# Patient Record
Sex: FEMALE | Race: WHITE | NOT HISPANIC OR LATINO | Employment: OTHER | URBAN - METROPOLITAN AREA
[De-identification: names, ages, dates, MRNs, and addresses within clinical notes are randomized per-mention and may not be internally consistent; named-entity substitution may affect disease eponyms.]

---

## 2017-02-03 DIAGNOSIS — M85.80 OSTEOPENIA: ICD-10-CM

## 2017-02-06 RX ORDER — ALENDRONATE SODIUM 70 MG/1
70 TABLET ORAL
Qty: 12 TAB | Refills: 1 | Status: SHIPPED | OUTPATIENT
Start: 2017-02-06 | End: 2017-04-28 | Stop reason: SDUPTHER

## 2017-02-27 RX ORDER — CYANOCOBALAMIN 1000 UG/ML
INJECTION, SOLUTION INTRAMUSCULAR; SUBCUTANEOUS
Qty: 1 ML | Refills: 0 | Status: SHIPPED | OUTPATIENT
Start: 2017-02-27 | End: 2017-04-28 | Stop reason: SDUPTHER

## 2017-03-06 RX ORDER — LEVOTHYROXINE SODIUM 200 MCG
TABLET ORAL
Qty: 90 TAB | Refills: 0 | Status: SHIPPED | OUTPATIENT
Start: 2017-03-06 | End: 2017-06-07 | Stop reason: SDUPTHER

## 2017-03-06 NOTE — TELEPHONE ENCOUNTER
Was the patient seen in the last year in this department? No LOV: 3-3-16    Does patient have an active prescription for medications requested? No     Received Request Via: Pharmacy

## 2017-03-16 DIAGNOSIS — Z00.00 PREVENTATIVE HEALTH CARE: ICD-10-CM

## 2017-03-16 DIAGNOSIS — M85.80 OSTEOPENIA: ICD-10-CM

## 2017-03-16 RX ORDER — LISINOPRIL 40 MG/1
40 TABLET ORAL DAILY
Qty: 90 TAB | Refills: 1 | OUTPATIENT
Start: 2017-03-16

## 2017-04-04 RX ORDER — CYANOCOBALAMIN 1000 UG/ML
INJECTION, SOLUTION INTRAMUSCULAR; SUBCUTANEOUS
Qty: 1 ML | Refills: 0 | OUTPATIENT
Start: 2017-04-04

## 2017-04-10 ENCOUNTER — TELEPHONE (OUTPATIENT)
Dept: MEDICAL GROUP | Facility: LAB | Age: 67
End: 2017-04-10

## 2017-04-10 NOTE — TELEPHONE ENCOUNTER
1. Caller Name: Patient                                         Call Back Number: [phone] (home)       Patient approves a detailed voicemail message: yes    Patient would like to know if she needs labs ordered before her appointment. Please advise.

## 2017-04-12 RX ORDER — FLUOXETINE HYDROCHLORIDE 20 MG/1
CAPSULE ORAL
Qty: 60 CAP | Refills: 0 | Status: SHIPPED | OUTPATIENT
Start: 2017-04-12 | End: 2017-04-28

## 2017-04-12 NOTE — TELEPHONE ENCOUNTER
Was the patient seen in the last year in this department? No     Does patient have an active prescription for medications requested? No     Received Request Via: Pharmacy     Last visit:3/3/16    Patient has appointment on 4/28

## 2017-04-13 RX ORDER — CYANOCOBALAMIN 1000 UG/ML
INJECTION, SOLUTION INTRAMUSCULAR; SUBCUTANEOUS
Qty: 1 ML | Refills: 0 | OUTPATIENT
Start: 2017-04-13

## 2017-04-13 NOTE — TELEPHONE ENCOUNTER
Was the patient seen in the last year in this department? No     Does patient have an active prescription for medications requested? No     Received Request Via: Patient

## 2017-04-18 ENCOUNTER — NON-PROVIDER VISIT (OUTPATIENT)
Dept: MEDICAL GROUP | Facility: LAB | Age: 67
End: 2017-04-18
Payer: COMMERCIAL

## 2017-04-18 DIAGNOSIS — E53.8 B12 DEFICIENCY: ICD-10-CM

## 2017-04-18 PROCEDURE — 96372 THER/PROPH/DIAG INJ SC/IM: CPT | Performed by: FAMILY MEDICINE

## 2017-04-18 RX ORDER — CYANOCOBALAMIN 1000 UG/ML
1000 INJECTION, SOLUTION INTRAMUSCULAR; SUBCUTANEOUS ONCE
Status: COMPLETED | OUTPATIENT
Start: 2017-04-18 | End: 2017-04-18

## 2017-04-18 RX ADMIN — CYANOCOBALAMIN 1000 MCG: 1000 INJECTION, SOLUTION INTRAMUSCULAR; SUBCUTANEOUS at 13:20

## 2017-04-18 NOTE — NON-PROVIDER
Lacy Ace is a 66 y.o. female here for a non-provider visit for B-12 injection.    Reason for injection: pernicious anema  Order in MAR?: Yes  Patient supplied?:No  Minimum interval has been met for this injection (per MAR order): Yes    Order and dose verified by: Farida Bowles  Patient tolerated injection and no adverse effects were observed or reported.    # of Administrations remaining in MAR:0

## 2017-04-18 NOTE — MR AVS SNAPSHOT
Lacy Martini Malka   2017 1:20 PM   Non-Provider Visit   MRN: [id number]    Department:  [facility]   Dept Phone:  [phone]    Description:  Female : 1950   Provider:  SERVANDO GOMES MA           Reason for Visit     Injections B-12      Allergies as of 2017     Allergen Noted Reactions    Nsaids 2016   Swelling    Facial swellness      You were diagnosed with     B12 deficiency   [[id number]]         Vital Signs     Smoking Status                   Never Smoker            Basic Information     Date Of Birth Sex Race Ethnicity Preferred Language    1950 Female White Non- English      Your appointments     2017  1:00 PM   ANNUAL EXAM PREVENTATIVE with May Villanueva M.D.   [facility] (--)    [address] [id number] [phone]              Problem List              ICD-10-CM Priority Class Noted - Resolved    Osteopenia M85.80   Unknown - Present    Menopause Z78.0   Unknown - Present    Pernicious anemia D51.0   Unknown - Present    Hypothyroid E03.9   Unknown - Present    Hypertension I10   8/15/2011 - Present    Urticaria, idiopathic L50.1   5/3/2012 - Present    Hyperlipidemia E78.5   5/3/2012 - Present    Depression F32.9   5/3/2012 - Present    Calcaneal spur M77.30   10/31/2012 - Present    MEDICAL HOME    Unknown - Present    Type 1 diabetes mellitus (CMS-HCC) E10.9   10/28/2013 - Present    PREMA on CPAP G47.33   3/3/2016 - Present    Vitamin D insufficiency E55.9   3/3/2016 - Present    B12 deficiency E53.8   2017 - Present      Health Maintenance        Date Due Completion Dates    PAP SMEAR 8/15/2014 8/15/2011, 2009    DIABETES MONOFILAMENT / LE EXAM 10/1/2014 10/1/2013 (Prv Comp)    Override on 10/1/2013: Previously completed (Dr. Lares's office)    A1C SCREENING 2016, 8/10/2015, 2015, 2014, 10/1/2013 (Prv Comp), 3/22/2012, 2011    Override on 10/1/2013:  Previously completed (Dr. Lares's office POCT)    RETINAL SCREENING 10/26/2016 10/26/2015, 10/22/2014    FASTING LIPID PROFILE 1/21/2017 1/21/2016, 11/5/2014, 9/10/2013, 3/22/2012, 8/2/2011    URINE ACR / MICROALBUMIN 1/21/2017 1/21/2016, 11/5/2014, 9/10/2013, 3/22/2012, 8/2/2011    SERUM CREATININE 1/21/2017 1/21/2016, 8/10/2015, 11/5/2014, 9/10/2013, 10/31/2012, 3/22/2012, 8/2/2011    MAMMOGRAM 3/30/2017 3/30/2016, 12/3/2013, 11/29/2012, 10/4/2011, 1/7/2009, 1/7/2009    IMM DTaP/Tdap/Td Vaccine (2 - Td) 10/24/2022 10/24/2012    COLONOSCOPY 4/26/2026 4/26/2016, 1/4/2006            Current Immunizations     Influenza TIV (IM) 10/28/2013  3:22 PM    Influenza Vaccine Quad Inj (Pf) 10/18/2016 10:30 AM, 10/21/2015  4:45 PM, 9/30/2014    Pneumococcal polysaccharide vaccine (PPSV-23) 10/24/2012  2:24 PM    SHINGLES VACCINE 10/24/2012  2:23 PM    Tdap Vaccine 10/24/2012  2:20 PM      Below and/or attached are the medications your provider expects you to take. Review all of your home medications and newly ordered medications with your provider and/or pharmacist. Follow medication instructions as directed by your provider and/or pharmacist. Please keep your medication list with you and share with your provider. Update the information when medications are discontinued, doses are changed, or new medications (including over-the-counter products) are added; and carry medication information at all times in the event of emergency situations     Allergies:  NSAIDS - Swelling               Medications  Valid as of: April 18, 2017 -  1:24 PM    Generic Name Brand Name Tablet Size Instructions for use    Alendronate Sodium (Tab) FOSAMAX 70 MG Take 1 Tab by mouth every 7 days.        Calcium Carbonate-Vitamin D (Tab) Calcium + D 600-200 MG-UNIT Take 600 mg by mouth 3 times a day.        Cetirizine HCl (Tab) ZYRTEC 10 MG Take 10 mg by mouth every day.        Cholecalciferol (Tab) vitamin D 2000 UNIT Take 1 Tab by mouth every day.       "   Cyanocobalamin (Solution) VITAMIN B-12 1000 MCG/ML INJECT 0.1 ML VIAL BY INTRAMUSCULAR ROUTE EVERY 30 DAYS        Fluocinonide (Solution) LIDEX 0.05 % Apply  to affected area(s) 2 times a day. Topical use only          FLUoxetine HCl (Cap) PROZAC 40 MG Take 1 Cap by mouth every day.        FLUoxetine HCl (Cap) PROZAC 20 MG TAKE 2 CAPSULES BY MOUTH EVERY DAY        Insulin Aspart (Solution) NOVOLOG 100 UNIT/ML Inject  as instructed 3 times a day before meals. Sliding scale as per Dr. Pérez         Insulin Glargine (Solution) LANTUS 100 UNIT/ML Inject 26 Units as instructed every bedtime.        Insulin Syringe-Needle U-100 (Misc) INS SYRINGE/NEEDLE 1CC/28G 28G X 1/2\" 1 ML Use to administer B12 as directed        Levothyroxine Sodium (Tab) SYNTHROID 200 MCG TAKE 1 TABLET BY MOUTH EVERY DAY        Lisinopril (Tab) PRINIVIL, ZESTRIL 40 MG Take 40 mg by mouth every day.          Multiple Vitamins-Minerals (Tab) CENTRUM SILVER  Take 1 Tab by mouth every day. daily        Simvastatin (Tab) ZOCOR 10 MG Take 10 mg by mouth every evening.        Zolpidem Tartrate (Tab) AMBIEN 10 MG Take 1 Tab by mouth at bedtime as needed for Sleep.        .                 Medicines prescribed today were sent to:     [id number] - [place] - [address] [facility] [address]    [address] [facility] [address]    Phone: [phone] Fax: [phone]    Open 24 Hours?: No    [facility] - [place], [place] - [address]    [address] [facility] [address]    Phone: [phone] Fax: [phone]    Open 24 Hours?: No    [facility] - [place], [place] - [address]    [address] [place] [address]    Phone: [phone] Fax: [phone]    Open 24 Hours?: No      Medication refill instructions:       If your prescription bottle indicates you have medication refills left, it is not necessary to call your provider’s office. " Please contact your pharmacy and they will refill your medication.    If your prescription bottle indicates you do not have any refills left, you may request refills at any time through one of the following ways: The online [employer] system (except Urgent Care), by calling your provider’s office, or by asking your pharmacy to contact your provider’s office with a refill request. Medication refills are processed only during regular business hours and may not be available until the next business day. Your provider may request additional information or to have a follow-up visit with you prior to refilling your medication.   *Please Note: Medication refills are assigned a new Rx number when refilled electronically. Your pharmacy may indicate that no refills were authorized even though a new prescription for the same medication is available at the pharmacy. Please request the medicine by name with the pharmacy before contacting your provider for a refill.        Other Notes About Your Plan     Lacy is a [facility] patient of Dr Edson Garcia Access Code: Activation code not generated  Current [employer] Status: Active

## 2017-04-28 ENCOUNTER — OFFICE VISIT (OUTPATIENT)
Dept: MEDICAL GROUP | Facility: LAB | Age: 67
End: 2017-04-28
Payer: COMMERCIAL

## 2017-04-28 VITALS
RESPIRATION RATE: 16 BRPM | WEIGHT: 218 LBS | BODY MASS INDEX: 38.62 KG/M2 | HEART RATE: 62 BPM | TEMPERATURE: 97.9 F | DIASTOLIC BLOOD PRESSURE: 92 MMHG | HEIGHT: 63 IN | OXYGEN SATURATION: 95 % | SYSTOLIC BLOOD PRESSURE: 142 MMHG

## 2017-04-28 DIAGNOSIS — F32.A DEPRESSION, UNSPECIFIED DEPRESSION TYPE: ICD-10-CM

## 2017-04-28 DIAGNOSIS — Z11.59 NEED FOR HEPATITIS C SCREENING TEST: ICD-10-CM

## 2017-04-28 DIAGNOSIS — E78.5 HYPERLIPIDEMIA, UNSPECIFIED HYPERLIPIDEMIA TYPE: ICD-10-CM

## 2017-04-28 DIAGNOSIS — Z12.31 ENCOUNTER FOR SCREENING MAMMOGRAM FOR BREAST CANCER: ICD-10-CM

## 2017-04-28 DIAGNOSIS — R82.90 MALODOROUS URINE: ICD-10-CM

## 2017-04-28 DIAGNOSIS — E03.8 OTHER SPECIFIED HYPOTHYROIDISM: ICD-10-CM

## 2017-04-28 DIAGNOSIS — E53.8 B12 DEFICIENCY: ICD-10-CM

## 2017-04-28 DIAGNOSIS — I10 ESSENTIAL HYPERTENSION: ICD-10-CM

## 2017-04-28 DIAGNOSIS — E10.9 TYPE 1 DIABETES MELLITUS WITHOUT COMPLICATION (HCC): ICD-10-CM

## 2017-04-28 DIAGNOSIS — E55.9 VITAMIN D INSUFFICIENCY: ICD-10-CM

## 2017-04-28 DIAGNOSIS — D51.0 PERNICIOUS ANEMIA: ICD-10-CM

## 2017-04-28 DIAGNOSIS — Z00.00 ROUTINE GENERAL MEDICAL EXAMINATION AT HEALTH CARE FACILITY: ICD-10-CM

## 2017-04-28 DIAGNOSIS — M85.80 OSTEOPENIA: ICD-10-CM

## 2017-04-28 LAB
APPEARANCE UR: CLEAR
BILIRUB UR STRIP-MCNC: NORMAL MG/DL
COLOR UR AUTO: YELLOW
GLUCOSE UR STRIP.AUTO-MCNC: NORMAL MG/DL
KETONES UR STRIP.AUTO-MCNC: NORMAL MG/DL
LEUKOCYTE ESTERASE UR QL STRIP.AUTO: NORMAL
NITRITE UR QL STRIP.AUTO: NORMAL
PH UR STRIP.AUTO: 7 [PH] (ref 5–8)
PROT UR QL STRIP: NORMAL MG/DL
RBC UR QL AUTO: NORMAL
SP GR UR STRIP.AUTO: 1
UROBILINOGEN UR STRIP-MCNC: NORMAL MG/DL

## 2017-04-28 PROCEDURE — 99396 PREV VISIT EST AGE 40-64: CPT | Performed by: FAMILY MEDICINE

## 2017-04-28 PROCEDURE — 81002 URINALYSIS NONAUTO W/O SCOPE: CPT | Performed by: FAMILY MEDICINE

## 2017-04-28 RX ORDER — CYANOCOBALAMIN 1000 UG/ML
INJECTION, SOLUTION INTRAMUSCULAR; SUBCUTANEOUS
Qty: 1 ML | Refills: 3 | Status: SHIPPED | OUTPATIENT
Start: 2017-04-28 | End: 2017-05-12 | Stop reason: SDUPTHER

## 2017-04-28 RX ORDER — ALENDRONATE SODIUM 70 MG/1
70 TABLET ORAL
Qty: 12 TAB | Refills: 3 | Status: SHIPPED | OUTPATIENT
Start: 2017-04-28 | End: 2018-05-10 | Stop reason: SDUPTHER

## 2017-04-28 RX ORDER — LEVOTHYROXINE SODIUM 200 MCG
200 TABLET ORAL
Qty: 90 TAB | Refills: 3 | Status: SHIPPED | OUTPATIENT
Start: 2017-04-28

## 2017-04-28 RX ORDER — FLUOXETINE HYDROCHLORIDE 40 MG/1
40 CAPSULE ORAL DAILY
Qty: 90 CAP | Refills: 3 | Status: SHIPPED | OUTPATIENT
Start: 2017-04-28

## 2017-04-28 ASSESSMENT — ENCOUNTER SYMPTOMS
NAUSEA: 0
DOUBLE VISION: 0
SHORTNESS OF BREATH: 0
VOMITING: 0
MYALGIAS: 0
FEVER: 0
HEADACHES: 0

## 2017-04-28 ASSESSMENT — PATIENT HEALTH QUESTIONNAIRE - PHQ9: CLINICAL INTERPRETATION OF PHQ2 SCORE: 2

## 2017-04-28 NOTE — PROGRESS NOTES
Subjective:      Lacy Ace is a 66 y.o. female who presents with Annual Exam and Medication Refill    Chief Complaint   Patient presents with   • Annual Exam     No Pap   • Medication Refill     Pending in orders             HPI    Would like to be tested for hep C. No tattoos, no snf time. No history of IVDA     Patient Active Problem List    Diagnosis Date Noted   • B12 deficiency  Chronic. On B12 monthly  04/18/2017   • PREMA on CPAP  Not using CPAP at night. Is uncomfortable  Not using this for about one year   Does not want to deal with this now  03/03/2016   • Vitamin D insufficiency  Taking vitamin D 03/03/2016   • Type 1 diabetes mellitus (CMS-[facility])  Has appt monday with endocrinology  10/28/2013   • MEDICAL HOME    • Calcaneal spur 10/31/2012   • Urticaria, idiopathic 05/03/2012   • Hyperlipidemia  On medication  05/03/2012   • Depression  On prozac  05/03/2012   • Hypertension  On medication  08/15/2011   • Osteopenia  On fosamax for at least 5 years . Was off this and fractured her ankle so she is back on this     • Menopause    • Pernicious anemia    • Hypothyroid  Followed by endo       Family History   Problem Relation Age of Onset   • Diabetes Mother    • Hypertension Mother    • Cancer Mother      multiple myeloma   • Heart Disease Father    • Cancer Father      lung   • Diabetes Sister    • Diabetes Brother      Social History     Social History   • Marital Status:      Spouse Name: N/A   • Number of Children: N/A   • Years of Education: N/A     Occupational History   • Not on file.     Social History Main Topics   • Smoking status: Never Smoker    • Smokeless tobacco: Never Used   • Alcohol Use: Yes      Comment: occasional   • Drug Use: Not on file   • Sexual Activity:     Partners: Male     Other Topics Concern   • Not on file     Social History Narrative       Current outpatient prescriptions:   •  fluoxetine (PROZAC) 20 MG Cap, TAKE 2 CAPSULES BY MOUTH EVERY DAY, Disp: 60 Cap, Rfl:  "0  •  SYNTHROID 200 MCG Tab, TAKE 1 TABLET BY MOUTH EVERY DAY, Disp: 90 Tab, Rfl: 0  •  cyanocobalamin (VITAMIN B-12) 1000 MCG/ML Solution, INJECT 0.1 ML VIAL BY INTRAMUSCULAR ROUTE EVERY 30 DAYS, Disp: 1 mL, Rfl: 0  •  alendronate (FOSAMAX) 70 MG Tab, Take 1 Tab by mouth every 7 days., Disp: 12 Tab, Rfl: 1  •  fluoxetine (PROZAC) 40 MG capsule, Take 1 Cap by mouth every day., Disp: 90 Cap, Rfl: 3  •  zolpidem (AMBIEN) 10 MG Tab, Take 1 Tab by mouth at bedtime as needed for Sleep., Disp: 30 Tab, Rfl: 1  •  insulin glargine (LANTUS) 100 UNIT/ML SOLN, Inject 26 Units as instructed every bedtime. (Patient taking differently: Inject 22 Units as instructed every bedtime.), Disp: 10 mL, Rfl: 0  •  INS SYRINGE/NEEDLE 1CC/28G (B-D INSULIN SYRINGE 1CC/28G) 28G X 1/2\" 1 ML MISC, Use to administer B12 as directed, Disp: 100 Each, Rfl: 0  •  lisinopril (PRINIVIL, ZESTRIL) 40 MG tablet, Take 40 mg by mouth every day.  , Disp: , Rfl:   •  simvastatin (ZOCOR) 10 MG TABS, Take 10 mg by mouth every evening., Disp: , Rfl:   •  fluocinonide (LIDEX) 0.05 % external solution, Apply  to affected area(s) 2 times a day. Topical use only  (Patient taking differently: Apply 1 Application to affected area(s) 2 times a day. Topical use only ), Disp: 60 mL, Rfl: 11  •  insulin aspart (NOVOLOG FLEXPEN) 100 UNIT/ML SOLN, Inject  as instructed 3 times a day before meals. Sliding scale as per Dr. Pérez , Disp: , Rfl:   •  Cholecalciferol (VITAMIN D) 2000 UNIT TABS, Take 1 Tab by mouth every day., Disp: , Rfl:   •  cetirizine (ZYRTEC) 10 MG TABS, Take 10 mg by mouth every day., Disp: , Rfl:   •  CENTRUM SILVER TABS, Take 1 Tab by mouth every day. daily, Disp: , Rfl:   •  CALCIUM + D 600-200 MG-UNIT TABS, Take 600 mg by mouth 3 times a day., Disp: , Rfl:       Review of Systems   Constitutional: Negative for fever.   Eyes: Negative for double vision.        Last eye exam october 2016    Respiratory: Negative for shortness of breath.  " "  Cardiovascular: Negative for chest pain.   Gastrointestinal: Negative for nausea and vomiting.   Genitourinary: Negative for frequency.        Different odor x 3 weeks   Musculoskeletal: Negative for myalgias.   Neurological: Negative for headaches.   Endo/Heme/Allergies: Positive for environmental allergies (zyrtec every day ).   Psychiatric/Behavioral:        On prozac for depression           Objective:     /92 mmHg  Pulse 62  Temp(Src) 36.6 °C (97.9 °F)  Resp 16  Ht 1.588 m (5' 2.52\")  Wt 98.884 kg (218 lb)  BMI 39.21 kg/m2  SpO2 95%     Physical Exam   Constitutional: She appears well-developed and well-nourished. She is active and cooperative.  Non-toxic appearance. She does not have a sickly appearance. No distress.   HENT:   Head: Normocephalic and atraumatic.   Right Ear: Tympanic membrane normal.   Left Ear: Tympanic membrane normal.   Mouth/Throat: Uvula is midline, oropharynx is clear and moist and mucous membranes are normal.   Eyes: Conjunctivae and EOM are normal.   Neck: Carotid bruit is not present. No thyromegaly present.   Cardiovascular: Normal rate, regular rhythm and normal heart sounds.    Pulmonary/Chest: Effort normal and breath sounds normal. No tachypnea. No respiratory distress. She has no decreased breath sounds. She has no wheezes. She has no rhonchi. She has no rales.   Abdominal: Soft. She exhibits no distension. There is no tenderness. There is no rigidity, no rebound and no guarding.   Lymphadenopathy:     She has no cervical adenopathy.   Neurological: She is alert. She is not disoriented. She displays no tremor. She displays no seizure activity. Coordination and gait normal.   Skin: Skin is warm and dry. She is not diaphoretic.   Psychiatric: She has a normal mood and affect. Her speech is normal and behavior is normal.               3/30/2016 1:47 PM    HISTORY/REASON FOR EXAM:  Postmenopausal, hysterectomy, osteopenia, diabetes, hypothyroidism    TECHNIQUE/EXAM " DESCRIPTION AND NUMBER OF VIEWS:  Dual x-ray bone densitometry was performed from the L1 through L4 levels and from the proximal left femur utilizing the GE Prodigy unit.    COMPARISON:   None    FINDINGS:  The mean bone mineral density for the lumbar spine is 1.120 g/cm2, which corresponds to a T score of -0.5 and a Z score of 1.0.    The proximal left femur has a mean bone mineral density of 1.073 g/cm2, with a T score of 0.5 and a Z score of 1.6.         Impression        According to the World Health Organization classification, bone mineral density of this patient is normal.      10-year Probability of Fracture:  Major Osteoporotic     11.3%  Hip     0.6%  Population      [place] ()    Based on left femur neck BMD     UA is normal      Assessment/Plan:     1. Routine general medical examination at health care facility  She is to follow up with specialist as planned     2. Essential hypertension  Recommend checking BP as outpatient   - TSH; Future  - COMP METABOLIC PANEL; Future  - MICROALBUMIN CREAT RATIO URINE; Future  - CBC WITH DIFFERENTIAL; Future    3. Osteopenia  On fosamax. dexa last year   - alendronate (FOSAMAX) 70 MG Tab; Take 1 Tab by mouth every 7 days.  Dispense: 12 Tab; Refill: 3    4. B12 deficiency  Check B12. Refilled medication     5. Vitamin D insufficiency  Check vit d level   - VITAMIN D,25 HYDROXY; Future    6. Type 1 diabetes mellitus without complication (CMS-[facility])  Followed by endocrinology   - LIPID PROFILE; Future  - COMP METABOLIC PANEL; Future  - MICROALBUMIN CREAT RATIO URINE; Future    7. Hyperlipidemia, unspecified hyperlipidemia type  Check labs. On statin   - LIPID PROFILE; Future  - COMP METABOLIC PANEL; Future    8. Depression, unspecified depression type  Discussed with patient trying 20 mg bid. She would like to continue with 40 mg in AM  - fluoxetine (PROZAC) 40 MG capsule; Take 1 Cap by mouth every day.  Dispense: 90 Cap; Refill: 3    9. Pernicious anemia  Check labs.    - VITAMIN B12; Future  - FOLATE; Future  - CBC WITH DIFFERENTIAL; Future    10. Other specified hypothyroidism  Check labs. Refilled medication   - SYNTHROID 200 MCG Tab; Take 1 Tab by mouth every day.  Dispense: 90 Tab; Refill: 3  - TSH; Future  - FREE THYROXINE; Future    11. Need for hepatitis C screening test  Check hep C virus antibody   - HEP C VIRUS ANTIBODY; Future    12. Malodorous urine  UA normal   - POCT Urinalysis    13. Encounter for screening mammogram for breast cancer  Mammogram ordered   - MA-SCREEN MAMMO W/CAD-BILAT'

## 2017-04-28 NOTE — MR AVS SNAPSHOT
"        Lacy Bunhchn   2017 1:00 PM   Office Visit   MRN: [id number]    Department:  [facility]   Dept Phone:  [phone]    Description:  Female : 1950   Provider:  May Villanueva M.D.           Reason for Visit     Annual Exam No Pap    Medication Refill Pending in orders      Allergies as of 2017     Allergen Noted Reactions    Nsaids 2016   Swelling    Facial swellness      You were diagnosed with     Routine general medical examination at health care facility   [514982]       Essential hypertension   [4413344]       Osteopenia   [221807]       B12 deficiency   [124935]       Vitamin D insufficiency   [843675]       Type 1 diabetes mellitus without complication (CMS-HCC)   [010463]       Hyperlipidemia, unspecified hyperlipidemia type   [6758596]       Depression, unspecified depression type   [1515873]       Pernicious anemia   [281.0.ICD-9-CM]       Other specified hypothyroidism   [6044434]       Need for hepatitis C screening test   [197483]       Malodorous urine   [681562]       Encounter for screening mammogram for breast cancer   [0209059]         Vital Signs     Blood Pressure Pulse Temperature Respirations Height Weight    142/92 mmHg 62 36.6 °C (97.9 °F) 16 1.588 m (5' 2.52\") 98.884 kg (218 lb)    Body Mass Index Oxygen Saturation Smoking Status             39.21 kg/m2 95% Never Smoker          Basic Information     Date Of Birth Sex Race Ethnicity Preferred Language    1950 Female White Non- English      Problem List              ICD-10-CM Priority Class Noted - Resolved    Osteopenia M85.80   Unknown - Present    Menopause Z78.0   Unknown - Present    Pernicious anemia D51.0   Unknown - Present    Hypothyroid E03.9   Unknown - Present    Hypertension I10   8/15/2011 - Present    Urticaria, idiopathic L50.1   5/3/2012 - Present    Hyperlipidemia E78.5   5/3/2012 - Present    Depression F32.9   5/3/2012 - Present    Calcaneal spur M77.30   " 10/31/2012 - Present    Type 1 diabetes mellitus (CMS-HCC) E10.9   10/28/2013 - Present    PREMA on CPAP G47.33   3/3/2016 - Present    Vitamin D insufficiency E55.9   3/3/2016 - Present    B12 deficiency E53.8   4/18/2017 - Present      Health Maintenance        Date Due Completion Dates    PAP SMEAR 8/15/2014 8/15/2011, 9/2/2009    DIABETES MONOFILAMENT / LE EXAM 10/1/2014 10/1/2013 (Prv Comp)    Override on 10/1/2013: Previously completed (Dr. Lares's office)    A1C SCREENING 7/21/2016 1/21/2016, 8/10/2015, 5/11/2015, 11/5/2014, 10/1/2013 (Prv Comp), 3/22/2012, 11/5/2011    Override on 10/1/2013: Previously completed (Dr. Lares's office POCT)    RETINAL SCREENING 10/26/2016 10/26/2015, 10/22/2014    FASTING LIPID PROFILE 1/21/2017 1/21/2016, 11/5/2014, 9/10/2013, 3/22/2012, 8/2/2011    URINE ACR / MICROALBUMIN 1/21/2017 1/21/2016, 11/5/2014, 9/10/2013, 3/22/2012, 8/2/2011    SERUM CREATININE 1/21/2017 1/21/2016, 8/10/2015, 11/5/2014, 9/10/2013, 10/31/2012, 3/22/2012, 8/2/2011    MAMMOGRAM 3/30/2017 3/30/2016, 12/3/2013, 11/29/2012, 10/4/2011, 1/7/2009, 1/7/2009    IMM DTaP/Tdap/Td Vaccine (2 - Td) 10/24/2022 10/24/2012    COLONOSCOPY 4/26/2026 4/26/2016, 1/4/2006            Current Immunizations     Influenza TIV (IM) 10/28/2013  3:22 PM    Influenza Vaccine Quad Inj (Pf) 10/18/2016 10:30 AM, 10/21/2015  4:45 PM, 9/30/2014    Pneumococcal polysaccharide vaccine (PPSV-23) 10/24/2012  2:24 PM    SHINGLES VACCINE 10/24/2012  2:23 PM    Tdap Vaccine 10/24/2012  2:20 PM      Below and/or attached are the medications your provider expects you to take. Review all of your home medications and newly ordered medications with your provider and/or pharmacist. Follow medication instructions as directed by your provider and/or pharmacist. Please keep your medication list with you and share with your provider. Update the information when medications are discontinued, doses are changed, or new medications (including  "over-the-counter products) are added; and carry medication information at all times in the event of emergency situations     Allergies:  NSAIDS - Swelling               Medications  Valid as of: April 28, 2017 -  1:40 PM    Generic Name Brand Name Tablet Size Instructions for use    Alendronate Sodium (Tab) FOSAMAX 70 MG Take 1 Tab by mouth every 7 days.        Calcium Carbonate-Vitamin D (Tab) Calcium + D 600-200 MG-UNIT Take 600 mg by mouth 3 times a day.        Cetirizine HCl (Tab) ZYRTEC 10 MG Take 10 mg by mouth every day.        Cholecalciferol (Tab) vitamin D 2000 UNIT Take 1 Tab by mouth every day.        Cyanocobalamin (Solution) VITAMIN B-12 1000 MCG/ML INJECT 0.1 ML VIAL BY INTRAMUSCULAR ROUTE EVERY 30 DAYS        Fluocinonide (Solution) LIDEX 0.05 % Apply  to affected area(s) 2 times a day. Topical use only          FLUoxetine HCl (Cap) PROZAC 40 MG Take 1 Cap by mouth every day.        Insulin Aspart (Solution) NOVOLOG 100 UNIT/ML Inject  as instructed 3 times a day before meals. Sliding scale as per Dr. Pérez         Insulin Glargine (Solution) LANTUS 100 UNIT/ML Inject 26 Units as instructed every bedtime.        Insulin Syringe-Needle U-100 ([facility] [place] [facility]) INS SYRINGE/NEEDLE 1CC/28G 28G X 1/2\" 1 ML Use to administer B12 as directed        Levothyroxine Sodium (Tab) SYNTHROID 200 MCG Take 1 Tab by mouth every day.        Lisinopril (Tab) PRINIVIL, ZESTRIL 40 MG Take 40 mg by mouth every day.          Multiple Vitamins-Minerals (Tab) CENTRUM SILVER  Take 1 Tab by mouth every day. daily        Simvastatin (Tab) ZOCOR 10 MG Take 10 mg by mouth every evening.        .                 Medicines prescribed today were sent to:     [id number] - [place] - [address] AT [facility] [address]    [address] [address]    Phone: [phone] Fax: [phone]    Open 24 Hours?: No    [facility]  - [place], [place] - [address] [place] [address] " [address] [address]    Phone: [phone] Fax: [phone]    Open 24 Hours?: No    [facility] - [place], [place] - [address]    [address] [place] [address]    Phone: [phone] Fax: [phone]    Open 24 Hours?: No      Medication refill instructions:       If your prescription bottle indicates you have medication refills left, it is not necessary to call your provider’s office. Please contact your pharmacy and they will refill your medication.    If your prescription bottle indicates you do not have any refills left, you may request refills at any time through one of the following ways: The online "[employer] system (except Urgent Care), by calling your provider’s office, or by asking your pharmacy to contact your provider’s office with a refill request. Medication refills are processed only during regular business hours and may not be available until the next business day. Your provider may request additional information or to have a follow-up visit with you prior to refilling your medication.   *Please Note: Medication refills are assigned a new Rx number when refilled electronically. Your pharmacy may indicate that no refills were authorized even though a new prescription for the same medication is available at the pharmacy. Please request the medicine by name with the pharmacy before contacting your provider for a refill.        Your To Do List     Future Labs/Procedures Complete By Expires    CBC WITH DIFFERENTIAL  As directed 4/28/2018    COMP METABOLIC PANEL  As directed 4/28/2018    FOLATE  As directed 4/28/2018    FREE THYROXINE  As directed 4/28/2018    HEP C VIRUS ANTIBODY  As directed 4/28/2018    LIPID PROFILE  As directed 4/28/2018    MICROALBUMIN CREAT RATIO URINE  As directed 4/28/2018    TSH  As directed 4/28/2018    VITAMIN B12  As directed 4/28/2018    VITAMIN D,25 HYDROXY  As directed 4/28/2018      Other Notes About Your Plan     Lacy  is a [facility] patient of Dr Edson Garcia Access Code: Activation code not generated  Current [facility] Status: Active

## 2017-05-01 ENCOUNTER — HOSPITAL ENCOUNTER (OUTPATIENT)
Dept: LAB | Facility: MEDICAL CENTER | Age: 67
End: 2017-05-01
Attending: FAMILY MEDICINE
Payer: COMMERCIAL

## 2017-05-01 DIAGNOSIS — D51.0 PERNICIOUS ANEMIA: ICD-10-CM

## 2017-05-01 DIAGNOSIS — E78.5 HYPERLIPIDEMIA, UNSPECIFIED HYPERLIPIDEMIA TYPE: ICD-10-CM

## 2017-05-01 DIAGNOSIS — E10.9 TYPE 1 DIABETES MELLITUS WITHOUT COMPLICATION (HCC): ICD-10-CM

## 2017-05-01 DIAGNOSIS — Z11.59 NEED FOR HEPATITIS C SCREENING TEST: ICD-10-CM

## 2017-05-01 DIAGNOSIS — E03.8 OTHER SPECIFIED HYPOTHYROIDISM: ICD-10-CM

## 2017-05-01 DIAGNOSIS — E55.9 VITAMIN D INSUFFICIENCY: ICD-10-CM

## 2017-05-01 DIAGNOSIS — I10 ESSENTIAL HYPERTENSION: ICD-10-CM

## 2017-05-01 LAB
25(OH)D3 SERPL-MCNC: 40 NG/ML (ref 30–100)
ALBUMIN SERPL BCP-MCNC: 3.7 G/DL (ref 3.2–4.9)
ALBUMIN/GLOB SERPL: 1.3 G/DL
ALP SERPL-CCNC: 105 U/L (ref 30–99)
ALT SERPL-CCNC: 10 U/L (ref 2–50)
ANION GAP SERPL CALC-SCNC: 5 MMOL/L (ref 0–11.9)
AST SERPL-CCNC: 17 U/L (ref 12–45)
BASOPHILS # BLD AUTO: 0.6 % (ref 0–1.8)
BASOPHILS # BLD: 0.04 K/UL (ref 0–0.12)
BILIRUB SERPL-MCNC: 0.4 MG/DL (ref 0.1–1.5)
BUN SERPL-MCNC: 19 MG/DL (ref 8–22)
CALCIUM SERPL-MCNC: 8.9 MG/DL (ref 8.5–10.5)
CHLORIDE SERPL-SCNC: 104 MMOL/L (ref 96–112)
CHOLEST SERPL-MCNC: 153 MG/DL (ref 100–199)
CO2 SERPL-SCNC: 26 MMOL/L (ref 20–33)
CREAT SERPL-MCNC: 0.75 MG/DL (ref 0.5–1.4)
CREAT UR-MCNC: 76.2 MG/DL
EOSINOPHIL # BLD AUTO: 0.21 K/UL (ref 0–0.51)
EOSINOPHIL NFR BLD: 3.3 % (ref 0–6.9)
ERYTHROCYTE [DISTWIDTH] IN BLOOD BY AUTOMATED COUNT: 45.2 FL (ref 35.9–50)
FOLATE SERPL-MCNC: >23.3 NG/ML
GFR SERPL CREATININE-BSD FRML MDRD: >60 ML/MIN/1.73 M 2
GLOBULIN SER CALC-MCNC: 2.8 G/DL (ref 1.9–3.5)
GLUCOSE SERPL-MCNC: 320 MG/DL (ref 65–99)
HCT VFR BLD AUTO: 42.7 % (ref 37–47)
HCV AB SER QL: NEGATIVE
HDLC SERPL-MCNC: 61 MG/DL
HGB BLD-MCNC: 14.1 G/DL (ref 12–16)
IMM GRANULOCYTES # BLD AUTO: 0.01 K/UL (ref 0–0.11)
IMM GRANULOCYTES NFR BLD AUTO: 0.2 % (ref 0–0.9)
LDLC SERPL CALC-MCNC: 76 MG/DL
LYMPHOCYTES # BLD AUTO: 2.07 K/UL (ref 1–4.8)
LYMPHOCYTES NFR BLD: 32.4 % (ref 22–41)
MCH RBC QN AUTO: 29.4 PG (ref 27–33)
MCHC RBC AUTO-ENTMCNC: 33 G/DL (ref 33.6–35)
MCV RBC AUTO: 89 FL (ref 81.4–97.8)
MICROALBUMIN UR-MCNC: 0.7 MG/DL
MICROALBUMIN/CREAT UR: 9 MG/G (ref 0–30)
MONOCYTES # BLD AUTO: 0.49 K/UL (ref 0–0.85)
MONOCYTES NFR BLD AUTO: 7.7 % (ref 0–13.4)
NEUTROPHILS # BLD AUTO: 3.56 K/UL (ref 2–7.15)
NEUTROPHILS NFR BLD: 55.8 % (ref 44–72)
NRBC # BLD AUTO: 0 K/UL
NRBC BLD AUTO-RTO: 0 /100 WBC
PLATELET # BLD AUTO: 240 K/UL (ref 164–446)
PMV BLD AUTO: 11.6 FL (ref 9–12.9)
POTASSIUM SERPL-SCNC: 3.9 MMOL/L (ref 3.6–5.5)
PROT SERPL-MCNC: 6.5 G/DL (ref 6–8.2)
RBC # BLD AUTO: 4.8 M/UL (ref 4.2–5.4)
SODIUM SERPL-SCNC: 135 MMOL/L (ref 135–145)
T4 FREE SERPL-MCNC: 1.18 NG/DL (ref 0.53–1.43)
TRIGL SERPL-MCNC: 79 MG/DL (ref 0–149)
TSH SERPL DL<=0.005 MIU/L-ACNC: 0.08 UIU/ML (ref 0.3–3.7)
VIT B12 SERPL-MCNC: 203 PG/ML (ref 211–911)
WBC # BLD AUTO: 6.4 K/UL (ref 4.8–10.8)

## 2017-05-01 PROCEDURE — 82306 VITAMIN D 25 HYDROXY: CPT

## 2017-05-01 PROCEDURE — 85025 COMPLETE CBC W/AUTO DIFF WBC: CPT

## 2017-05-01 PROCEDURE — 82570 ASSAY OF URINE CREATININE: CPT

## 2017-05-01 PROCEDURE — 84443 ASSAY THYROID STIM HORMONE: CPT

## 2017-05-01 PROCEDURE — 80053 COMPREHEN METABOLIC PANEL: CPT

## 2017-05-01 PROCEDURE — 82746 ASSAY OF FOLIC ACID SERUM: CPT

## 2017-05-01 PROCEDURE — 36415 COLL VENOUS BLD VENIPUNCTURE: CPT

## 2017-05-01 PROCEDURE — 80061 LIPID PANEL: CPT

## 2017-05-01 PROCEDURE — 82043 UR ALBUMIN QUANTITATIVE: CPT

## 2017-05-01 PROCEDURE — 82607 VITAMIN B-12: CPT

## 2017-05-01 PROCEDURE — 84439 ASSAY OF FREE THYROXINE: CPT

## 2017-05-01 PROCEDURE — 86803 HEPATITIS C AB TEST: CPT

## 2017-05-02 ENCOUNTER — TELEPHONE (OUTPATIENT)
Dept: MEDICAL GROUP | Facility: LAB | Age: 67
End: 2017-05-02

## 2017-05-02 NOTE — TELEPHONE ENCOUNTER
[email or web address] Released Result Comments      Entered by May Villanueva M.D. at 5/2/2017 12:49 PM     Read by Lacy Ace at 5/2/2017  1:08 PM     Please have patient schedule a follow-up appointment     Also sent my chart asking to help schedule

## 2017-05-02 NOTE — TELEPHONE ENCOUNTER
----- Message from May Villanueva M.D. sent at 5/2/2017 12:49 PM PDT -----  Please have patient schedule a follow-up appointment.

## 2017-05-12 ENCOUNTER — OFFICE VISIT (OUTPATIENT)
Dept: MEDICAL GROUP | Facility: LAB | Age: 67
End: 2017-05-12
Payer: COMMERCIAL

## 2017-05-12 VITALS
OXYGEN SATURATION: 99 % | BODY MASS INDEX: 39.34 KG/M2 | HEIGHT: 63 IN | WEIGHT: 222 LBS | HEART RATE: 64 BPM | TEMPERATURE: 98.5 F | RESPIRATION RATE: 12 BRPM | SYSTOLIC BLOOD PRESSURE: 138 MMHG | DIASTOLIC BLOOD PRESSURE: 78 MMHG

## 2017-05-12 DIAGNOSIS — R74.8 ELEVATED ALKALINE PHOSPHATASE LEVEL: ICD-10-CM

## 2017-05-12 DIAGNOSIS — E55.9 VITAMIN D INSUFFICIENCY: ICD-10-CM

## 2017-05-12 DIAGNOSIS — E53.8 B12 DEFICIENCY: ICD-10-CM

## 2017-05-12 DIAGNOSIS — E03.9 HYPOTHYROIDISM, UNSPECIFIED TYPE: ICD-10-CM

## 2017-05-12 PROCEDURE — 99213 OFFICE O/P EST LOW 20 MIN: CPT | Performed by: FAMILY MEDICINE

## 2017-05-12 RX ORDER — CYANOCOBALAMIN 1000 UG/ML
INJECTION, SOLUTION INTRAMUSCULAR; SUBCUTANEOUS
Qty: 3 ML | Refills: 3 | Status: SHIPPED | OUTPATIENT
Start: 2017-05-12

## 2017-05-12 RX ORDER — CYANOCOBALAMIN 1000 UG/ML
1000 INJECTION, SOLUTION INTRAMUSCULAR; SUBCUTANEOUS ONCE
Status: COMPLETED | OUTPATIENT
Start: 2017-05-12 | End: 2017-05-12

## 2017-05-12 RX ADMIN — CYANOCOBALAMIN 1000 MCG: 1000 INJECTION, SOLUTION INTRAMUSCULAR; SUBCUTANEOUS at 16:12

## 2017-05-12 NOTE — MR AVS SNAPSHOT
"        Lacy Bunchhn   2017 3:40 PM   Office Visit   MRN: [id number]    Department:  [facility]   Dept Phone:  [phone]    Description:  Female : 1950   Provider:  May Villanueva M.D.           Reason for Visit     Follow-Up labs      Allergies as of 2017     Allergen Noted Reactions    Nsaids 2016   Swelling    Facial swellness      You were diagnosed with     Hypothyroidism, unspecified type   [3413210]       B12 deficiency   [329258]       Vitamin D insufficiency   [325891]       Elevated alkaline phosphatase level   [381664]         Vital Signs     Blood Pressure Pulse Temperature Respirations Height Weight    138/78 mmHg 64 36.9 °C (98.5 °F) 12 1.588 m (5' 2.52\") 100.699 kg (222 lb)    Body Mass Index Oxygen Saturation Smoking Status             39.93 kg/m2 99% Never Smoker          Basic Information     Date Of Birth Sex Race Ethnicity Preferred Language    1950 Female White Non- English      Problem List              ICD-10-CM Priority Class Noted - Resolved    Osteopenia M85.80   Unknown - Present    Menopause Z78.0   Unknown - Present    Pernicious anemia D51.0   Unknown - Present    Hypothyroid E03.9   Unknown - Present    Hypertension I10   8/15/2011 - Present    Urticaria, idiopathic L50.1   5/3/2012 - Present    Hyperlipidemia E78.5   5/3/2012 - Present    Depression F32.9   5/3/2012 - Present    Calcaneal spur M77.30   10/31/2012 - Present    Type 1 diabetes mellitus (CMS-HCC) E10.9   10/28/2013 - Present    PREMA on CPAP G47.33, Z99.89   3/3/2016 - Present    Vitamin D insufficiency E55.9   3/3/2016 - Present    B12 deficiency E53.8   2017 - Present      Health Maintenance        Date Due Completion Dates    PAP SMEAR 8/15/2014 8/15/2011, 2009    DIABETES MONOFILAMENT / LE EXAM 10/1/2014 10/1/2013 (Prv Comp)    Override on 10/1/2013: Previously completed (Dr. Lares's office)    A1C SCREENING 2016, 8/10/2015, 2015, " 11/5/2014, 10/1/2013 (Prv Comp), 3/22/2012, 11/5/2011    Override on 10/1/2013: Previously completed (Dr. Lares's office POCT)    MAMMOGRAM 3/30/2017 3/30/2016, 12/3/2013, 11/29/2012, 10/4/2011, 1/7/2009, 1/7/2009    RETINAL SCREENING 10/27/2017 10/27/2016, 10/26/2015, 10/22/2014    FASTING LIPID PROFILE 5/1/2018 5/1/2017, 1/21/2016, 11/5/2014, 9/10/2013, 3/22/2012, 8/2/2011    URINE ACR / MICROALBUMIN 5/1/2018 5/1/2017, 1/21/2016, 11/5/2014, 9/10/2013, 3/22/2012, 8/2/2011    SERUM CREATININE 5/1/2018 5/1/2017, 1/21/2016, 8/10/2015, 11/5/2014, 9/10/2013, 10/31/2012, 3/22/2012, 8/2/2011    IMM DTaP/Tdap/Td Vaccine (2 - Td) 10/24/2022 10/24/2012    COLONOSCOPY 4/26/2026 4/26/2016, 1/4/2006            Current Immunizations     Influenza TIV (IM) 10/28/2013  3:22 PM    Influenza Vaccine Quad Inj (Pf) 10/18/2016 10:30 AM, 10/21/2015  4:45 PM, 9/30/2014    Pneumococcal polysaccharide vaccine (PPSV-23) 10/24/2012  2:24 PM    SHINGLES VACCINE 10/24/2012  2:23 PM    Tdap Vaccine 10/24/2012  2:20 PM      Below and/or attached are the medications your provider expects you to take. Review all of your home medications and newly ordered medications with your provider and/or pharmacist. Follow medication instructions as directed by your provider and/or pharmacist. Please keep your medication list with you and share with your provider. Update the information when medications are discontinued, doses are changed, or new medications (including over-the-counter products) are added; and carry medication information at all times in the event of emergency situations     Allergies:  NSAIDS - Swelling               Medications  Valid as of: May 12, 2017 -  4:14 PM    Generic Name Brand Name Tablet Size Instructions for use    Alendronate Sodium (Tab) FOSAMAX 70 MG Take 1 Tab by mouth every 7 days.        Calcium Carbonate-Vitamin D (Tab) Calcium + D 600-200 MG-UNIT Take 600 mg by mouth 3 times a day.        Cetirizine HCl (Tab) ZYRTEC 10  "MG Take 10 mg by mouth every day.        Cholecalciferol (Tab) vitamin D 2000 UNIT Take 1 Tab by mouth every day.        Cyanocobalamin (Solution) VITAMIN B-12 1000 MCG/ML INJECT 1 ML VIAL BY INTRAMUSCULAR ROUTE EVERY 30 DAYS        Fluocinonide (Solution) LIDEX 0.05 % Apply  to affected area(s) 2 times a day. Topical use only          FLUoxetine HCl (Cap) PROZAC 40 MG Take 1 Cap by mouth every day.        Insulin Aspart (Solution) NOVOLOG 100 UNIT/ML Inject  as instructed 3 times a day before meals. Sliding scale as per Dr. Pérez         Insulin Glargine (Solution) LANTUS 100 UNIT/ML Inject 26 Units as instructed every bedtime.        Insulin Syringe-Needle U-100 (Misc) INS SYRINGE/NEEDLE 1CC/28G 28G X 1/2\" 1 ML Use to administer B12 as directed        Levothyroxine Sodium (Tab) SYNTHROID 200 MCG Take 1 Tab by mouth every day.        Lisinopril (Tab) PRINIVIL, ZESTRIL 40 MG Take 40 mg by mouth every day.          Multiple Vitamins-Minerals (Tab) CENTRUM SILVER  Take 1 Tab by mouth every day. daily        Simvastatin (Tab) ZOCOR 10 MG Take 10 mg by mouth every evening.        .                 Medicines prescribed today were sent to:     [id number] - [place] - [address] AT [facility] [address]    [address] [address]    Phone: [phone] Fax: [phone]    Open 24 Hours?: No    [facility] - [place], [place] - [address] [facility]    [address] [facility] [address] [place] [address]    Phone: [phone] Fax: [phone]    Open 24 Hours?: No    [facility] - [place], [place] - [address]    [address] [place] [address]    Phone: [phone] Fax: [phone]    Open 24 Hours?: No      Medication refill instructions:       If your prescription bottle indicates you have medication refills left, it is not necessary to call your provider’s office. Please contact your pharmacy and they will refill your " medication.    If your prescription bottle indicates you do not have any refills left, you may request refills at any time through one of the following ways: The online [employer] system (except Urgent Care), by calling your provider’s office, or by asking your pharmacy to contact your provider’s office with a refill request. Medication refills are processed only during regular business hours and may not be available until the next business day. Your provider may request additional information or to have a follow-up visit with you prior to refilling your medication.   *Please Note: Medication refills are assigned a new Rx number when refilled electronically. Your pharmacy may indicate that no refills were authorized even though a new prescription for the same medication is available at the pharmacy. Please request the medicine by name with the pharmacy before contacting your provider for a refill.        Your To Do List     Future Labs/Procedures Complete By Expires    ALKALINE PHOSPHATASE ISOENZYMES  As directed 5/12/2018    FREE THYROXINE  As directed 5/12/2018    TSH  As directed 5/12/2018      Other Notes About Your Plan     Lacy is a [facility] patient of Dr Edson Garcia Access Code: Activation code not generated  Current [employer] Status: Active

## 2017-05-12 NOTE — PROGRESS NOTES
Chief Complaint   Patient presents with   • Follow-Up     labs       HISTORY OF PRESENT ILLNESS: Patient is a 66 y.o. female established patient who presents today to review labs. Patient was last seen on April 28, 2017.    B12 deficiency  This is a chronic problem. Patient's B-12 level is low at 203. Patient states that there has been an error with her prescription. She usually gets B-12 1000 micrograms per mL and gets 1 mL IM every 30 days. Her prescription was somehow changed to 0.1 mL every 30 days. A new prescription was sent to her pharmacy today. She is willing to get B-12 today in the clinic.    Vitamin D insufficiency  This is a chronic problem. Patient is taking vitamin D3 supplementation. Her most recent level is normal at 40.    Hypothyroid  This is a chronic problem. Patient takes Synthroid 200 µg once a day. Her most recent labs show that her TSH is suppressed at 0.080. Her free T4 is normal at 1.18.    Sees endocrinology and A1c was 8.7 recently    Additional labs show her hepatitis C surface antibody negative. Also her alkaline phosphatase was slightly high at 105. Her folic acid level was normal. Her lipid panel was normal. Her chemistry panel was significant again for alkaline phosphatase of 105 than the elevated fasting glucose of 320.    Patient Active Problem List    Diagnosis Date Noted   • B12 deficiency 04/18/2017   • PREMA on CPAP 03/03/2016   • Vitamin D insufficiency 03/03/2016   • Type 1 diabetes mellitus (CMS-[facility]) 10/28/2013   • Calcaneal spur 10/31/2012   • Urticaria, idiopathic 05/03/2012   • Hyperlipidemia 05/03/2012   • Depression 05/03/2012   • Hypertension 08/15/2011   • Osteopenia    • Menopause    • Pernicious anemia    • Hypothyroid         Allergies:Nsaids    Current Outpatient Prescriptions   Medication Sig Dispense Refill   • fluoxetine (PROZAC) 40 MG capsule Take 1 Cap by mouth every day. 90 Cap 3   • SYNTHROID 200 MCG Tab Take 1 Tab by mouth every day. 90 Tab 3   •  "alendronate (FOSAMAX) 70 MG Tab Take 1 Tab by mouth every 7 days. 12 Tab 3   • cyanocobalamin (VITAMIN B-12) 1000 MCG/ML Solution INJECT 0.1 ML VIAL BY INTRAMUSCULAR ROUTE EVERY 30 DAYS 1 mL 3   • insulin glargine (LANTUS) 100 UNIT/ML SOLN Inject 26 Units as instructed every bedtime. (Patient taking differently: Inject 22 Units as instructed every bedtime.) 10 mL 0   • INS SYRINGE/NEEDLE 1CC/28G (B-D INSULIN SYRINGE 1CC/28G) 28G X 1/2\" 1 ML MISC Use to administer B12 as directed 100 Each 0   • lisinopril (PRINIVIL, ZESTRIL) 40 MG tablet Take 40 mg by mouth every day.       • simvastatin (ZOCOR) 10 MG TABS Take 10 mg by mouth every evening.     • fluocinonide (LIDEX) 0.05 % external solution Apply  to affected area(s) 2 times a day. Topical use only   (Patient taking differently: Apply 1 Application to affected area(s) 2 times a day. Topical use only  ) 60 mL 11   • insulin aspart (NOVOLOG FLEXPEN) 100 UNIT/ML SOLN Inject  as instructed 3 times a day before meals. Sliding scale as per Dr. Pérez      • Cholecalciferol (VITAMIN D) 2000 UNIT TABS Take 1 Tab by mouth every day.     • cetirizine (ZYRTEC) 10 MG TABS Take 10 mg by mouth every day.     • CENTRUM SILVER TABS Take 1 Tab by mouth every day. daily     • CALCIUM + D 600-200 MG-UNIT TABS Take 600 mg by mouth 3 times a day.       No current facility-administered medications for this visit.       Social History   Substance Use Topics   • Smoking status: Never Smoker    • Smokeless tobacco: Never Used   • Alcohol Use: Yes      Comment: occasional       Social History     Social History Narrative       Family History   Problem Relation Age of Onset   • Diabetes Mother    • Hypertension Mother    • Cancer Mother      multiple myeloma   • Heart Disease Father    • Cancer Father      lung   • Diabetes Sister    • Diabetes Brother        ROS:        Exam:    Blood pressure 138/78, pulse 64, temperature 36.9 °C (98.5 °F), resp. rate 12, height 1.588 m (5' 2.52\"), weight " 100.699 kg (222 lb), SpO2 99 %.      Physical Exam   Constitutional: Appears well-developed and well-nourished. Is active and cooperative.  Non-toxic appearance.   Head: Normocephalic and atraumatic.   Right Ear: External ear normal. Left Ear: External ear normal.   Eyes: Conjunctivae, EOM and lids are normal. No scleral icterus.   Neurological: Alert. No tremor. No display of seizure activity. Coordination and gait normal.   Skin: Skin is warm and dry. Patient is not diaphoretic.   Psychiatric: patient has a normal mood and affect; speech is normal and behavior is normal.      Hospital Outpatient Visit on 05/01/2017   Component Date Value Ref Range Status   • Vitamin B12 -True Cobalamin 05/01/2017 203* 211 - 911 pg/mL Final   • Folate -Folic Acid 05/01/2017 >23.3  >4.0 ng/mL Final   • TSH 05/01/2017 0.080* 0.300 - 3.700 uIU/mL Final   • Free T-4 05/01/2017 1.18  0.53 - 1.43 ng/dL Final   • Cholesterol,Tot 05/01/2017 153  100 - 199 mg/dL Final   • Triglycerides 05/01/2017 79  0 - 149 mg/dL Final   • HDL 05/01/2017 61  >=40 mg/dL Final   • LDL 05/01/2017 76  <100 mg/dL Final   • Sodium 05/01/2017 135  135 - 145 mmol/L Final   • Potassium 05/01/2017 3.9  3.6 - 5.5 mmol/L Final   • Chloride 05/01/2017 104  96 - 112 mmol/L Final   • Co2 05/01/2017 26  20 - 33 mmol/L Final   • Anion Gap 05/01/2017 5.0  0.0 - 11.9 Final   • Glucose 05/01/2017 320* 65 - 99 mg/dL Final   • Bun 05/01/2017 19  8 - 22 mg/dL Final   • Creatinine 05/01/2017 0.75  0.50 - 1.40 mg/dL Final   • Calcium 05/01/2017 8.9  8.5 - 10.5 mg/dL Final   • AST(SGOT) 05/01/2017 17  12 - 45 U/L Final   • ALT(SGPT) 05/01/2017 10  2 - 50 U/L Final   • Alkaline Phosphatase 05/01/2017 105* 30 - 99 U/L Final   • Total Bilirubin 05/01/2017 0.4  0.1 - 1.5 mg/dL Final   • Albumin 05/01/2017 3.7  3.2 - 4.9 g/dL Final   • Total Protein 05/01/2017 6.5  6.0 - 8.2 g/dL Final   • Globulin 05/01/2017 2.8  1.9 - 3.5 g/dL Final   • A-G Ratio 05/01/2017 1.3   Final   • Creatinine,  Urine 05/01/2017 76.20   Final   • Microalbumin, Urine Random 05/01/2017 0.7   Final   • Micro Alb Creat Ratio 05/01/2017 9  0 - 30 mg/g Final   • 25-Hydroxy   Vitamin D 25 05/01/2017 40  30 - 100 ng/mL Final    Comment: Adult Ranges:   <20 ng/mL - Deficiency  20-29 ng/mL - Insufficiency   ng/mL - Sufficiency  The [employer] Vitamin D Assay is standardized to the  [facility] reference measurement procedures, a  reference method for the Vitamin D Standardization Program  (VDSP).  The VDSP aligns patient results among 25 (OH)  Vitamin D methods.     • WBC 05/01/2017 6.4  4.8 - 10.8 K/uL Final   • RBC 05/01/2017 4.80  4.20 - 5.40 M/uL Final   • Hemoglobin 05/01/2017 14.1  12.0 - 16.0 g/dL Final   • Hematocrit 05/01/2017 42.7  37.0 - 47.0 % Final   • MCV 05/01/2017 89.0  81.4 - 97.8 fL Final   • MCH 05/01/2017 29.4  27.0 - 33.0 pg Final   • MCHC 05/01/2017 33.0* 33.6 - 35.0 g/dL Final   • RDW 05/01/2017 45.2  35.9 - 50.0 fL Final   • Platelet Count 05/01/2017 240  164 - 446 K/uL Final   • MPV 05/01/2017 11.6  9.0 - 12.9 fL Final   • Neutrophils-Polys 05/01/2017 55.80  44.00 - 72.00 % Final   • Lymphocytes 05/01/2017 32.40  22.00 - 41.00 % Final   • Monocytes 05/01/2017 7.70  0.00 - 13.40 % Final   • Eosinophils 05/01/2017 3.30  0.00 - 6.90 % Final   • Basophils 05/01/2017 0.60  0.00 - 1.80 % Final   • Immature Granulocytes 05/01/2017 0.20  0.00 - 0.90 % Final   • Nucleated RBC 05/01/2017 0.00   Final   • Neutrophils (Absolute) 05/01/2017 3.56  2.00 - 7.15 K/uL Final    Includes immature neutrophils, if present.   • Lymphs (Absolute) 05/01/2017 2.07  1.00 - 4.80 K/uL Final   • Monos (Absolute) 05/01/2017 0.49  0.00 - 0.85 K/uL Final   • Eos (Absolute) 05/01/2017 0.21  0.00 - 0.51 K/uL Final   • Baso (Absolute) 05/01/2017 0.04  0.00 - 0.12 K/uL Final   • Immature Granulocytes (abs) 05/01/2017 0.01  0.00 - 0.11 K/uL Final   • NRBC (Absolute) 05/01/2017 0.00   Final   • Hepatitis C Antibody 05/01/2017  Negative  Negative Final    Comment: The [employer] HCV assay is limited to the detection of IgG  antibodies to hepatitis C virus in human serum.  The results  from this or any other diagnostic kit should be used and interpreted  only in the context of the overall clinical picture.  A negative  test result does not exclude the possibility of exposure to  hepatitis C virus.     • GFR If  05/01/2017 >60  >60 mL/min/1.73 m 2 Final   • GFR If Non  05/01/2017 >60  >60 mL/min/1.73 m 2 Final         Assessment/Plan:     1. Hypothyroidism, unspecified type  Discussed with patient. She is currently taking Synthroid 200 µg once daily. I would like her to take half a pill, 100 µg, once a week on Saturdays. All other days she is to continue to take 200 µg once daily. She is to check her labs in 8 weeks.  - TSH; Future  - FREE THYROXINE; Future    2. B12 deficiency  Patient was given B-12 IM today in the clinic. Her prescription was changed. She is to get one mL IM every 30 days.  - cyanocobalamin (VITAMIN B-12) 1000 MCG/ML Solution; INJECT 1 ML VIAL BY INTRAMUSCULAR ROUTE EVERY 30 DAYS  Dispense: 3 mL; Refill: 3  - cyanocobalamin (VITAMIN B-12) injection 1,000 mcg; 1 mL by Intramuscular route Once.    3. Vitamin D insufficiency  Patient is to continue her vitamin D. Her vitamin D level is normal    4. Elevated alkaline phosphatase level  Recheck in 8 weeks and will also check isoenzymes.  - ALKALINE PHOSPHATASE ISOENZYMES; Future    She is to follow-up with endocrinology as planned    Please note that this dictation was created using voice recognition software. I have made every reasonable attempt to correct obvious errors, but I expect that there are errors of grammar and possibly content that I did not discover before finalizing the note.

## 2017-05-12 NOTE — ASSESSMENT & PLAN NOTE
This is chronic. Her most recent labs show that her TSH is suppressed at 0.080. Her free T4 is normal at 1.18.

## 2017-06-07 NOTE — TELEPHONE ENCOUNTER
Was the patient seen in the last year in this department? Yes     Does patient have an active prescription for medications requested? No     Received Request Via: Pharmacy     Last visit:5/12/17

## 2017-06-08 RX ORDER — LEVOTHYROXINE SODIUM 200 MCG
TABLET ORAL
Qty: 90 TAB | Refills: 0 | Status: SHIPPED | OUTPATIENT
Start: 2017-06-08

## 2018-05-10 RX ORDER — ALENDRONATE SODIUM 70 MG/1
70 TABLET ORAL
Qty: 12 TAB | Refills: 0 | Status: SHIPPED | OUTPATIENT
Start: 2018-05-10

## 2018-08-01 ENCOUNTER — APPOINTMENT (RX ONLY)
Dept: URBAN - METROPOLITAN AREA CLINIC 135 | Facility: CLINIC | Age: 68
Setting detail: DERMATOLOGY
End: 2018-08-01

## 2018-08-01 DIAGNOSIS — L82.1 OTHER SEBORRHEIC KERATOSIS: ICD-10-CM

## 2018-08-01 DIAGNOSIS — Z12.83 ENCOUNTER FOR SCREENING FOR MALIGNANT NEOPLASM OF SKIN: ICD-10-CM

## 2018-08-01 DIAGNOSIS — D485 NEOPLASM OF UNCERTAIN BEHAVIOR OF SKIN: ICD-10-CM

## 2018-08-01 PROBLEM — D48.5 NEOPLASM OF UNCERTAIN BEHAVIOR OF SKIN: Status: ACTIVE | Noted: 2018-08-01

## 2018-08-01 PROCEDURE — 11101: CPT

## 2018-08-01 PROCEDURE — 99203 OFFICE O/P NEW LOW 30 MIN: CPT | Mod: 25

## 2018-08-01 PROCEDURE — 11100: CPT

## 2018-08-01 PROCEDURE — ? COUNSELING

## 2018-08-01 PROCEDURE — ? BIOPSY BY SHAVE METHOD

## 2018-08-01 ASSESSMENT — LOCATION ZONE DERM
LOCATION ZONE: LIP
LOCATION ZONE: LEG
LOCATION ZONE: LIP
LOCATION ZONE: FACE

## 2018-08-01 ASSESSMENT — LOCATION SIMPLE DESCRIPTION DERM
LOCATION SIMPLE: LEFT THIGH
LOCATION SIMPLE: RIGHT LIP
LOCATION SIMPLE: RIGHT CHEEK
LOCATION SIMPLE: RIGHT LIP
LOCATION SIMPLE: RIGHT THIGH

## 2018-08-01 ASSESSMENT — LOCATION DETAILED DESCRIPTION DERM
LOCATION DETAILED: RIGHT SUPERIOR CENTRAL MALAR CHEEK
LOCATION DETAILED: LEFT ANTERIOR PROXIMAL THIGH
LOCATION DETAILED: RIGHT ANTERIOR PROXIMAL THIGH
LOCATION DETAILED: RIGHT UPPER CUTANEOUS LIP
LOCATION DETAILED: RIGHT UPPER CUTANEOUS LIP

## 2018-08-01 NOTE — PROCEDURE: BIOPSY BY SHAVE METHOD
Notification Instructions: Patient will be notified of biopsy results. However, patient instructed to call the office if not contacted within 2 weeks.
Anesthesia Type: 1% lidocaine with epinephrine
Cryotherapy Text: The wound bed was treated with cryotherapy after the biopsy was performed.
Type Of Destruction Used: Electrodesiccation
Accession #: [id number]
Biopsy Type: H and E
Consent: Written consent was obtained and risks were reviewed including but not limited to scarring, infection, bleeding, scabbing, incomplete removal, nerve damage and allergy to anesthesia.
Electrodesiccation Text: The wound bed was treated with electrodesiccation after the biopsy was performed.
Dressing: bandage
Post-Care Instructions: I reviewed with the patient in detail post-care instructions. Patient is to keep the biopsy site dry overnight, and then apply bacitracin twice daily until healed. Patient may apply hydrogen peroxide soaks to remove any crusting.
Billing Type: Third-Party Bill
Depth Of Biopsy: dermis
Biopsy Method: Personna blade
Additional Anesthesia Volume In Cc (Will Not Render If 0): 0
Size Of Lesion In Cm: 0.5
Electrodesiccation And Curettage Text: The wound bed was treated with electrodesiccation and curettage after the biopsy was performed.
Bill For Surgical Tray: no
Detail Level: Detailed
Silver Nitrate Text: The wound bed was treated with silver nitrate after the biopsy was performed.
Size Of Lesion In Cm: 0.3
Wound Care: Petrolatum
Was A Bandage Applied: Yes
Accession #: [id number]

## 2019-12-12 ENCOUNTER — APPOINTMENT (RX ONLY)
Dept: URBAN - METROPOLITAN AREA CLINIC 135 | Facility: CLINIC | Age: 69
Setting detail: DERMATOLOGY
End: 2019-12-12

## 2019-12-12 DIAGNOSIS — L82.0 INFLAMED SEBORRHEIC KERATOSIS: ICD-10-CM

## 2019-12-12 DIAGNOSIS — D485 NEOPLASM OF UNCERTAIN BEHAVIOR OF SKIN: ICD-10-CM

## 2019-12-12 PROBLEM — D48.5 NEOPLASM OF UNCERTAIN BEHAVIOR OF SKIN: Status: ACTIVE | Noted: 2019-12-12

## 2019-12-12 PROCEDURE — ? LIQUID NITROGEN

## 2019-12-12 PROCEDURE — 17110 DESTRUCTION B9 LES UP TO 14: CPT

## 2019-12-12 PROCEDURE — 11102 TANGNTL BX SKIN SINGLE LES: CPT | Mod: 59

## 2019-12-12 PROCEDURE — 99214 OFFICE O/P EST MOD 30 MIN: CPT | Mod: 25

## 2019-12-12 PROCEDURE — ? BIOPSY BY SHAVE METHOD

## 2019-12-12 ASSESSMENT — LOCATION SIMPLE DESCRIPTION DERM
LOCATION SIMPLE: CHEST
LOCATION SIMPLE: RIGHT CHEEK

## 2019-12-12 ASSESSMENT — LOCATION ZONE DERM
LOCATION ZONE: FACE
LOCATION ZONE: TRUNK

## 2019-12-12 ASSESSMENT — LOCATION DETAILED DESCRIPTION DERM
LOCATION DETAILED: UPPER STERNUM
LOCATION DETAILED: RIGHT LATERAL MALAR CHEEK

## 2019-12-12 NOTE — PROCEDURE: LIQUID NITROGEN
Medical Necessity Clause: This procedure was medically necessary because the lesions that were treated were:
Post-Care Instructions: I reviewed with the patient in detail post-care instructions. Patient is to wear sunprotection, and avoid picking at any of the treated lesions. Pt may apply Vaseline to crusted or scabbing areas.
Consent: The patient's consent was obtained including but not limited to risks of crusting, scabbing, blistering, scarring, darker or lighter pigmentary change, recurrence, incomplete removal and infection.
Medical Necessity Information: It is in your best interest to select a reason for this procedure from the list below. All of these items fulfill various CMS LCD requirements except the new and changing color options.
Render Post-Care Instructions In Note?: no
Detail Level: Detailed

## 2019-12-12 NOTE — PROCEDURE: BIOPSY BY SHAVE METHOD
Additional Anesthesia Volume In Cc (Will Not Render If 0): 0
Hide Topical Anesthesia?: No
Electrodesiccation And Curettage Text: The wound bed was treated with electrodesiccation and curettage after the biopsy was performed.
Detail Level: Detailed
Billing Type: Third-Party Bill
Wound Care: Petrolatum
Biopsy Method: Personna blade
Type Of Destruction Used: Electrodesiccation
Consent: Written consent was obtained and risks were reviewed including but not limited to scarring, infection, bleeding, scabbing, incomplete removal, nerve damage and allergy to anesthesia.
Anesthesia Volume In Cc: 0.5
Post-Care Instructions: I reviewed with the patient in detail post-care instructions. Patient is to keep the biopsy site dry overnight, and then apply bacitracin twice daily until healed. Patient may apply hydrogen peroxide soaks to remove any crusting.
Depth Of Biopsy: dermis
Was A Bandage Applied: Yes
Cryotherapy Text: The wound bed was treated with cryotherapy after the biopsy was performed.
Accession #: [id number]
Notification Instructions: Patient will be notified of biopsy results. However, patient instructed to call the office if not contacted within 2 weeks.
Anesthesia Type: 1% lidocaine with epinephrine
Biopsy Type: H and E
Dressing: bandage
Silver Nitrate Text: The wound bed was treated with silver nitrate after the biopsy was performed.
Size Of Lesion In Cm: 0.6
Electrodesiccation Text: The wound bed was treated with electrodesiccation after the biopsy was performed.

## 2020-01-16 ENCOUNTER — APPOINTMENT (RX ONLY)
Dept: URBAN - METROPOLITAN AREA CLINIC 135 | Facility: CLINIC | Age: 70
Setting detail: DERMATOLOGY
End: 2020-01-16

## 2020-01-16 DIAGNOSIS — L57.0 ACTINIC KERATOSIS: ICD-10-CM

## 2020-01-16 PROCEDURE — ? LIQUID NITROGEN

## 2020-01-16 PROCEDURE — 17000 DESTRUCT PREMALG LESION: CPT

## 2020-01-16 ASSESSMENT — LOCATION ZONE DERM: LOCATION ZONE: TRUNK

## 2020-01-16 ASSESSMENT — LOCATION SIMPLE DESCRIPTION DERM: LOCATION SIMPLE: CHEST

## 2020-01-16 ASSESSMENT — LOCATION DETAILED DESCRIPTION DERM: LOCATION DETAILED: MIDDLE STERNUM

## 2020-07-16 ENCOUNTER — APPOINTMENT (RX ONLY)
Dept: URBAN - METROPOLITAN AREA CLINIC 135 | Facility: CLINIC | Age: 70
Setting detail: DERMATOLOGY
End: 2020-07-16

## 2020-07-16 DIAGNOSIS — Z12.83 ENCOUNTER FOR SCREENING FOR MALIGNANT NEOPLASM OF SKIN: ICD-10-CM

## 2020-07-16 DIAGNOSIS — L57.0 ACTINIC KERATOSIS: ICD-10-CM

## 2020-07-16 PROCEDURE — 99213 OFFICE O/P EST LOW 20 MIN: CPT

## 2020-07-16 PROCEDURE — ? COUNSELING

## 2020-07-16 ASSESSMENT — LOCATION DETAILED DESCRIPTION DERM: LOCATION DETAILED: RIGHT MEDIAL SUPERIOR CHEST

## 2020-07-16 ASSESSMENT — LOCATION SIMPLE DESCRIPTION DERM: LOCATION SIMPLE: CHEST

## 2020-07-16 ASSESSMENT — LOCATION ZONE DERM: LOCATION ZONE: TRUNK

## 2021-03-03 DIAGNOSIS — Z23 NEED FOR VACCINATION: ICD-10-CM

## 2021-07-07 ENCOUNTER — APPOINTMENT (RX ONLY)
Dept: URBAN - METROPOLITAN AREA CLINIC 135 | Facility: CLINIC | Age: 71
Setting detail: DERMATOLOGY
End: 2021-07-07

## 2021-07-07 DIAGNOSIS — Z12.83 ENCOUNTER FOR SCREENING FOR MALIGNANT NEOPLASM OF SKIN: ICD-10-CM

## 2021-07-07 DIAGNOSIS — L57.0 ACTINIC KERATOSIS: ICD-10-CM

## 2021-07-07 DIAGNOSIS — L40.0 PSORIASIS VULGARIS: ICD-10-CM

## 2021-07-07 DIAGNOSIS — D485 NEOPLASM OF UNCERTAIN BEHAVIOR OF SKIN: ICD-10-CM

## 2021-07-07 PROBLEM — D48.5 NEOPLASM OF UNCERTAIN BEHAVIOR OF SKIN: Status: ACTIVE | Noted: 2021-07-07

## 2021-07-07 PROCEDURE — ? TREATMENT REGIMEN

## 2021-07-07 PROCEDURE — 11102 TANGNTL BX SKIN SINGLE LES: CPT

## 2021-07-07 PROCEDURE — ? DEFER

## 2021-07-07 PROCEDURE — 99213 OFFICE O/P EST LOW 20 MIN: CPT | Mod: 25

## 2021-07-07 PROCEDURE — ? COUNSELING

## 2021-07-07 PROCEDURE — ? BIOPSY BY SHAVE METHOD

## 2021-07-07 PROCEDURE — ? PRESCRIPTION

## 2021-07-07 RX ORDER — FLUOCINOLONE ACETONIDE 0.1 MG/ML
SOLUTION TOPICAL
Qty: 1 | Refills: 1 | Status: ERX | COMMUNITY
Start: 2021-07-07

## 2021-07-07 RX ADMIN — FLUOCINOLONE ACETONIDE SMALL AMOUNT: 0.1 SOLUTION TOPICAL at 00:00

## 2021-07-07 ASSESSMENT — LOCATION DETAILED DESCRIPTION DERM
LOCATION DETAILED: INFERIOR MID FOREHEAD
LOCATION DETAILED: LEFT LATERAL MALAR CHEEK
LOCATION DETAILED: RIGHT DISTAL POSTERIOR UPPER ARM
LOCATION DETAILED: RIGHT SUPERIOR LATERAL MALAR CHEEK

## 2021-07-07 ASSESSMENT — LOCATION SIMPLE DESCRIPTION DERM
LOCATION SIMPLE: RIGHT POSTERIOR UPPER ARM
LOCATION SIMPLE: LEFT CHEEK
LOCATION SIMPLE: INFERIOR FOREHEAD
LOCATION SIMPLE: RIGHT CHEEK

## 2021-07-07 ASSESSMENT — LOCATION ZONE DERM
LOCATION ZONE: ARM
LOCATION ZONE: FACE

## 2021-07-07 NOTE — PROCEDURE: BIOPSY BY SHAVE METHOD
Hemostasis: Aluminum Chloride and Electrocautery
Validate Anticipated Plan: No
Silver Nitrate Text: The wound bed was treated with silver nitrate after the biopsy was performed.
Was A Bandage Applied: Yes
Accession #: [id number]
Electrodesiccation And Curettage Text: The wound bed was treated with electrodesiccation and curettage after the biopsy was performed.
Billing Type: Third-Party Bill
Anesthesia Type: 1% lidocaine with epinephrine
Type Of Destruction Used: Curettage
Biopsy Type: H and E
Wound Care: Petrolatum
Electrodesiccation Text: The wound bed was treated with electrodesiccation after the biopsy was performed.
Cryotherapy Text: The wound bed was treated with cryotherapy after the biopsy was performed.
Consent: Written consent was obtained and risks were reviewed including but not limited to scarring, infection, bleeding, scabbing, incomplete removal, nerve damage and allergy to anesthesia.
X Size Of Lesion In Cm: 0
Anesthesia Volume In Cc: 0.5
Information: Selecting Yes will display possible errors in your note based on the variables you have selected. This validation is only offered as a suggestion for you. PLEASE NOTE THAT THE VALIDATION TEXT WILL BE REMOVED WHEN YOU FINALIZE YOUR NOTE. IF YOU WANT TO FAX A PRELIMINARY NOTE YOU WILL NEED TO TOGGLE THIS TO 'NO' IF YOU DO NOT WANT IT IN YOUR FAXED NOTE.
Curettage Text: The wound bed was treated with curettage after the biopsy was performed.
Dressing: Band-Aid
Biopsy Method: Dermablade
Notification Instructions: Patient will be notified of biopsy results. However, patient instructed to call the office if not contacted within 2 weeks.
Detail Level: Detailed
Post-Care Instructions: I reviewed with the patient in detail post-care instructions. Patient is to keep the biopsy site dry overnight, and then apply bacitracin twice daily until healed. Patient may apply hydrogen peroxide soaks to remove any crusting.
Depth Of Biopsy: dermis

## 2021-07-28 ENCOUNTER — APPOINTMENT (RX ONLY)
Dept: URBAN - METROPOLITAN AREA CLINIC 135 | Facility: CLINIC | Age: 71
Setting detail: DERMATOLOGY
End: 2021-07-28

## 2021-07-28 DIAGNOSIS — L57.0 ACTINIC KERATOSIS: ICD-10-CM

## 2021-07-28 PROCEDURE — 96573 PDT DSTR PRMLG LES PHYS/QHP: CPT

## 2021-07-28 PROCEDURE — ? PDT: BLUE

## 2021-07-28 ASSESSMENT — LOCATION DETAILED DESCRIPTION DERM: LOCATION DETAILED: RIGHT INFERIOR MEDIAL FOREHEAD

## 2021-07-28 ASSESSMENT — LOCATION SIMPLE DESCRIPTION DERM: LOCATION SIMPLE: RIGHT FOREHEAD

## 2021-07-28 ASSESSMENT — LOCATION ZONE DERM: LOCATION ZONE: FACE

## 2021-07-28 NOTE — PROCEDURE: PDT: BLUE
Anesthesia Type: 1% lidocaine with epinephrine
Ndc# (Optional): [id number]
Consent: Written consent obtained.  The risks were reviewed with the patient including but not limited to: pigmentary changes, pain, blistering, scabbing, redness, and the remote possibility of scarring.
Which Photosensitizer Was Used: Ameluz
Anesthesia Volume In Cc: 0
Occlusion: No
Medical Necessity: Precancerous Lesions
Detail Level: Simple
Light Source: Tito-U
Debridement Text (Will Only Render In Visit Note If You Select Debridement Option Under Who Performed The Pdt Field): Prior to application of the photodynamic medication the hyperkeratotic lesions were curetted to make them more amenable to therapy.
Who Performed The Pdt?: Performed by MD AZAEL, CHINEDU or NP ([id number])
Pre-Procedure Text: The treatment areas were cleaned and prepped in the usual fashion with acetone
Show Anesthesia In Plan?: Yes
Illumination Time: 00:16:40
Number Of Kerasticks/Tubes Billed For: 1
Post-Care Instructions: I reviewed with the patient in detail post-care instructions. Patient is to avoid sunlight for the next 2 days, and wear sun protection. Patients may expect sunburn like redness, discomfort and scabbing.
Incubation Time: 01:45

## 2021-08-12 ENCOUNTER — APPOINTMENT (RX ONLY)
Dept: URBAN - METROPOLITAN AREA CLINIC 135 | Facility: CLINIC | Age: 71
Setting detail: DERMATOLOGY
End: 2021-08-12

## 2021-08-12 DIAGNOSIS — L57.0 ACTINIC KERATOSIS: ICD-10-CM

## 2021-08-12 PROCEDURE — ? COUNSELING

## 2021-08-12 PROCEDURE — 99212 OFFICE O/P EST SF 10 MIN: CPT

## 2021-08-12 ASSESSMENT — LOCATION DETAILED DESCRIPTION DERM
LOCATION DETAILED: RIGHT INFERIOR CENTRAL MALAR CHEEK
LOCATION DETAILED: LEFT CENTRAL MALAR CHEEK
LOCATION DETAILED: INFERIOR MID FOREHEAD

## 2021-08-12 ASSESSMENT — LOCATION ZONE DERM: LOCATION ZONE: FACE

## 2021-08-12 ASSESSMENT — LOCATION SIMPLE DESCRIPTION DERM
LOCATION SIMPLE: LEFT CHEEK
LOCATION SIMPLE: RIGHT CHEEK
LOCATION SIMPLE: INFERIOR FOREHEAD

## 2022-02-10 ENCOUNTER — APPOINTMENT (RX ONLY)
Dept: URBAN - METROPOLITAN AREA CLINIC 135 | Facility: CLINIC | Age: 72
Setting detail: DERMATOLOGY
End: 2022-02-10

## 2022-02-10 DIAGNOSIS — Z87.2 PERSONAL HISTORY OF DISEASES OF THE SKIN AND SUBCUTANEOUS TISSUE: ICD-10-CM

## 2022-02-10 DIAGNOSIS — L82.1 OTHER SEBORRHEIC KERATOSIS: ICD-10-CM

## 2022-02-10 PROCEDURE — ? COUNSELING

## 2022-02-10 PROCEDURE — 99212 OFFICE O/P EST SF 10 MIN: CPT

## 2022-02-10 ASSESSMENT — LOCATION SIMPLE DESCRIPTION DERM
LOCATION SIMPLE: LEFT THIGH
LOCATION SIMPLE: RIGHT THIGH

## 2022-02-10 ASSESSMENT — LOCATION ZONE DERM: LOCATION ZONE: LEG

## 2022-02-10 ASSESSMENT — LOCATION DETAILED DESCRIPTION DERM
LOCATION DETAILED: RIGHT ANTERIOR LATERAL PROXIMAL THIGH
LOCATION DETAILED: LEFT ANTERIOR PROXIMAL THIGH

## 2023-02-09 ENCOUNTER — APPOINTMENT (RX ONLY)
Dept: URBAN - METROPOLITAN AREA CLINIC 135 | Facility: CLINIC | Age: 73
Setting detail: DERMATOLOGY
End: 2023-02-09

## 2023-02-09 DIAGNOSIS — Z12.83 ENCOUNTER FOR SCREENING FOR MALIGNANT NEOPLASM OF SKIN: ICD-10-CM

## 2023-02-09 DIAGNOSIS — L40.0 PSORIASIS VULGARIS: ICD-10-CM

## 2023-02-09 DIAGNOSIS — L82.1 OTHER SEBORRHEIC KERATOSIS: ICD-10-CM

## 2023-02-09 PROBLEM — C44.01 BASAL CELL CARCINOMA OF SKIN OF LIP: Status: ACTIVE | Noted: 2023-02-09

## 2023-02-09 PROCEDURE — ? BIOPSY BY SHAVE METHOD

## 2023-02-09 PROCEDURE — 99213 OFFICE O/P EST LOW 20 MIN: CPT | Mod: 25

## 2023-02-09 PROCEDURE — ? PRESCRIPTION

## 2023-02-09 PROCEDURE — ? REFERRAL

## 2023-02-09 PROCEDURE — ? COUNSELING

## 2023-02-09 PROCEDURE — 11102 TANGNTL BX SKIN SINGLE LES: CPT

## 2023-02-09 RX ORDER — FLUOCINOLONE ACETONIDE 0.1 MG/ML
1 SOLUTION TOPICAL DAILY
Qty: 90 | Refills: 2 | Status: ERX | COMMUNITY
Start: 2023-02-09

## 2023-02-09 RX ADMIN — FLUOCINOLONE ACETONIDE SMALL AMOUNT: 0.1 SOLUTION TOPICAL at 00:00

## 2023-02-09 ASSESSMENT — LOCATION SIMPLE DESCRIPTION DERM
LOCATION SIMPLE: RIGHT CALF
LOCATION SIMPLE: POSTERIOR SCALP

## 2023-02-09 ASSESSMENT — LOCATION ZONE DERM
LOCATION ZONE: LEG
LOCATION ZONE: SCALP

## 2023-02-09 ASSESSMENT — LOCATION DETAILED DESCRIPTION DERM
LOCATION DETAILED: LEFT INFERIOR OCCIPITAL SCALP
LOCATION DETAILED: RIGHT PROXIMAL CALF

## 2023-02-09 NOTE — HPI: RASH (ECZEMA)
How Severe Is Your Eczema?: mild
Is This A New Presentation, Or A Follow-Up?: Rash
Additional History: Pt presents for her annual full body skin check. She wld also like her scalp checked and addressed, pt states she has eczema.

## 2023-02-09 NOTE — PROCEDURE: MIPS QUALITY
Quality 111:Pneumonia Vaccination Status For Older Adults: Pneumococcal vaccine (PPSV23) administered on or after patient’s 60th birthday and before the end of the measurement period
Quality 47: Advance Care Plan: Advance Care Planning discussed and documented; advance care plan or surrogate decision maker documented in the medical record.
Detail Level: Detailed
Name And Contact Information For Health Care Proxy: Celio Moore
Quality 110: Preventive Care And Screening: Influenza Immunization: Influenza Immunization not Administered because Patient Refused.

## 2023-02-09 NOTE — PROCEDURE: BIOPSY BY SHAVE METHOD
Validate Note Data (See Information Below): Yes
Hide Additional Anticipated Plan?: No
Notification Instructions: Patient will be notified of biopsy results. However, patient instructed to call the office if not contacted within 2 weeks.
Anesthesia Type: 1% lidocaine with epinephrine
Depth Of Biopsy: dermis
Accession #: [id number]
Electrodesiccation Text: The wound bed was treated with electrodesiccation after the biopsy was performed.
Post-Care Instructions: I reviewed with the patient in detail post-care instructions. Patient is to keep the biopsy site dry overnight, and then apply bacitracin twice daily until healed. Patient may apply hydrogen peroxide soaks to remove any crusting.
Information: Selecting Yes will display possible errors in your note based on the variables you have selected. This validation is only offered as a suggestion for you. PLEASE NOTE THAT THE VALIDATION TEXT WILL BE REMOVED WHEN YOU FINALIZE YOUR NOTE. IF YOU WANT TO FAX A PRELIMINARY NOTE YOU WILL NEED TO TOGGLE THIS TO 'NO' IF YOU DO NOT WANT IT IN YOUR FAXED NOTE.
X Size Of Lesion In Cm: 0
Biopsy Type: H and E
Silver Nitrate Text: The wound bed was treated with silver nitrate after the biopsy was performed.
Consent: Verbal consent was obtained and risks were reviewed including but not limited to scarring, infection, bleeding, scabbing, incomplete removal, nerve damage and allergy to anesthesia.
Anesthesia Volume In Cc: 0.5
Biopsy Method: Personna blade
Hemostasis: Electrodesiccation
Size Of Lesion In Cm: 0.4
Cryotherapy Text: The wound bed was treated with cryotherapy after the biopsy was performed.
Wound Care: Petrolatum
Electrodesiccation And Curettage Text: The wound bed was treated with electrodesiccation and curettage after the biopsy was performed.
Billing Type: Third-Party Bill
Detail Level: Detailed
Dressing: bandage

## 2023-07-11 ENCOUNTER — APPOINTMENT (RX ONLY)
Dept: URBAN - METROPOLITAN AREA CLINIC 135 | Facility: CLINIC | Age: 73
Setting detail: DERMATOLOGY
End: 2023-07-11

## 2023-07-11 DIAGNOSIS — L57.0 ACTINIC KERATOSIS: ICD-10-CM

## 2023-07-11 PROCEDURE — ? LIQUID NITROGEN

## 2023-07-11 PROCEDURE — 17000 DESTRUCT PREMALG LESION: CPT

## 2023-07-11 PROCEDURE — 17003 DESTRUCT PREMALG LES 2-14: CPT

## 2023-07-11 ASSESSMENT — LOCATION DETAILED DESCRIPTION DERM
LOCATION DETAILED: LEFT CENTRAL MALAR CHEEK
LOCATION DETAILED: NASAL DORSUM

## 2023-07-11 ASSESSMENT — LOCATION ZONE DERM
LOCATION ZONE: FACE
LOCATION ZONE: NOSE

## 2023-07-11 ASSESSMENT — LOCATION SIMPLE DESCRIPTION DERM
LOCATION SIMPLE: LEFT CHEEK
LOCATION SIMPLE: NOSE

## 2023-07-11 NOTE — PROCEDURE: LIQUID NITROGEN
Show Aperture Variable?: Yes
Number Of Freeze-Thaw Cycles: 3 freeze-thaw cycles
Consent: The patient's consent was obtained including but not limited to risks of crusting, scabbing, blistering, scarring, darker or lighter pigmentary change, recurrence, incomplete removal and infection.
Duration Of Freeze Thaw-Cycle (Seconds): 15
Detail Level: Detailed
Render Post-Care Instructions In Note?: no
Post-Care Instructions: I reviewed with the patient in detail post-care instructions. Patient is to wear sunprotection, and avoid picking at any of the treated lesions. Pt may apply Vaseline to crusted or scabbing areas.

## 2023-07-11 NOTE — PROCEDURE: MIPS QUALITY
Detail Level: Detailed
Quality 110: Preventive Care And Screening: Influenza Immunization: Influenza Immunization Administered during Influenza season
Quality 111:Pneumonia Vaccination Status For Older Adults: Patient received any pneumococcal conjugate or polysaccharide vaccine on or after their 60th birthday and before the end of the measurement period
Quality 47: Advance Care Plan: Advance Care Planning discussed and documented; advance care plan or surrogate decision maker documented in the medical record.
Name And Contact Information For Health Care Proxy: Celio Moore

## 2023-10-10 ENCOUNTER — APPOINTMENT (RX ONLY)
Dept: URBAN - METROPOLITAN AREA CLINIC 135 | Facility: CLINIC | Age: 73
Setting detail: DERMATOLOGY
End: 2023-10-10

## 2023-10-10 DIAGNOSIS — L57.0 ACTINIC KERATOSIS: ICD-10-CM | Status: RESOLVED

## 2023-10-10 PROCEDURE — ? COUNSELING

## 2023-10-10 PROCEDURE — 99212 OFFICE O/P EST SF 10 MIN: CPT

## 2023-10-10 ASSESSMENT — LOCATION SIMPLE DESCRIPTION DERM
LOCATION SIMPLE: LEFT CHEEK
LOCATION SIMPLE: NOSE

## 2023-10-10 ASSESSMENT — LOCATION DETAILED DESCRIPTION DERM
LOCATION DETAILED: NASAL DORSUM
LOCATION DETAILED: LEFT CENTRAL MALAR CHEEK

## 2023-10-10 ASSESSMENT — LOCATION ZONE DERM
LOCATION ZONE: FACE
LOCATION ZONE: NOSE

## 2024-01-11 ENCOUNTER — APPOINTMENT (RX ONLY)
Dept: URBAN - METROPOLITAN AREA CLINIC 135 | Facility: CLINIC | Age: 74
Setting detail: DERMATOLOGY
End: 2024-01-11

## 2024-01-11 DIAGNOSIS — L82.1 OTHER SEBORRHEIC KERATOSIS: ICD-10-CM

## 2024-01-11 DIAGNOSIS — Z12.83 ENCOUNTER FOR SCREENING FOR MALIGNANT NEOPLASM OF SKIN: ICD-10-CM

## 2024-01-11 PROCEDURE — 99212 OFFICE O/P EST SF 10 MIN: CPT

## 2024-01-11 PROCEDURE — ? COUNSELING

## 2024-01-11 ASSESSMENT — LOCATION DETAILED DESCRIPTION DERM: LOCATION DETAILED: LEFT PROXIMAL CALF

## 2024-01-11 ASSESSMENT — LOCATION ZONE DERM: LOCATION ZONE: LEG

## 2024-01-11 ASSESSMENT — LOCATION SIMPLE DESCRIPTION DERM: LOCATION SIMPLE: LEFT CALF

## 2024-04-16 ENCOUNTER — APPOINTMENT (RX ONLY)
Dept: URBAN - METROPOLITAN AREA CLINIC 135 | Facility: CLINIC | Age: 74
Setting detail: DERMATOLOGY
End: 2024-04-16

## 2024-04-16 DIAGNOSIS — Z12.83 ENCOUNTER FOR SCREENING FOR MALIGNANT NEOPLASM OF SKIN: ICD-10-CM

## 2024-04-16 DIAGNOSIS — Z85.828 PERSONAL HISTORY OF OTHER MALIGNANT NEOPLASM OF SKIN: ICD-10-CM | Status: RESOLVED

## 2024-04-16 PROCEDURE — ? COUNSELING

## 2024-04-16 PROCEDURE — 99212 OFFICE O/P EST SF 10 MIN: CPT

## 2024-04-16 ASSESSMENT — LOCATION DETAILED DESCRIPTION DERM: LOCATION DETAILED: LEFT UPPER CUTANEOUS LIP

## 2024-04-16 ASSESSMENT — LOCATION ZONE DERM: LOCATION ZONE: LIP

## 2024-04-16 ASSESSMENT — LOCATION SIMPLE DESCRIPTION DERM: LOCATION SIMPLE: LEFT LIP

## 2024-10-17 ENCOUNTER — APPOINTMENT (RX ONLY)
Dept: URBAN - METROPOLITAN AREA CLINIC 135 | Facility: CLINIC | Age: 74
Setting detail: DERMATOLOGY
End: 2024-10-17

## 2024-10-17 DIAGNOSIS — L57.0 ACTINIC KERATOSIS: ICD-10-CM

## 2024-10-17 DIAGNOSIS — L82.1 OTHER SEBORRHEIC KERATOSIS: ICD-10-CM

## 2024-10-17 DIAGNOSIS — D485 NEOPLASM OF UNCERTAIN BEHAVIOR OF SKIN: ICD-10-CM

## 2024-10-17 PROBLEM — D48.5 NEOPLASM OF UNCERTAIN BEHAVIOR OF SKIN: Status: ACTIVE | Noted: 2024-10-17

## 2024-10-17 PROCEDURE — 11102 TANGNTL BX SKIN SINGLE LES: CPT

## 2024-10-17 PROCEDURE — 17000 DESTRUCT PREMALG LESION: CPT | Mod: 59

## 2024-10-17 PROCEDURE — ? LIQUID NITROGEN

## 2024-10-17 PROCEDURE — 99212 OFFICE O/P EST SF 10 MIN: CPT | Mod: 25

## 2024-10-17 PROCEDURE — ? BIOPSY BY SHAVE METHOD

## 2024-10-17 PROCEDURE — ? COUNSELING

## 2024-10-17 ASSESSMENT — LOCATION ZONE DERM
LOCATION ZONE: TRUNK
LOCATION ZONE: FACE

## 2024-10-17 ASSESSMENT — LOCATION SIMPLE DESCRIPTION DERM
LOCATION SIMPLE: UPPER BACK
LOCATION SIMPLE: CHEST
LOCATION SIMPLE: INFERIOR FOREHEAD
LOCATION SIMPLE: ABDOMEN

## 2024-10-17 ASSESSMENT — LOCATION DETAILED DESCRIPTION DERM
LOCATION DETAILED: LEFT MEDIAL SUPERIOR CHEST
LOCATION DETAILED: INFERIOR MID FOREHEAD
LOCATION DETAILED: INFERIOR THORACIC SPINE
LOCATION DETAILED: EPIGASTRIC SKIN

## 2024-10-17 NOTE — PROCEDURE: LIQUID NITROGEN
Render Note In Bullet Format When Appropriate: No
Show Applicator Variable?: Yes
Duration Of Freeze Thaw-Cycle (Seconds): 10
Post-Care Instructions: I reviewed with the patient in detail post-care instructions. Patient is to wear sunprotection, and avoid picking at any of the treated lesions. Pt may apply Vaseline to crusted or scabbing areas.
Number Of Freeze-Thaw Cycles: 1 freeze-thaw cycle
Consent: The patient's consent was obtained including but not limited to risks of crusting, scabbing, blistering, scarring, darker or lighter pigmentary change, recurrence, incomplete removal and infection.
Detail Level: Detailed

## 2024-10-17 NOTE — PROCEDURE: MIPS QUALITY
Detail Level: Detailed
Quality 226: Preventive Care And Screening: Tobacco Use: Screening And Cessation Intervention: Patient screened for tobacco use and is an ex/non-smoker
Name And Contact Information For Health Care Proxy: [phone],

## 2025-04-17 ENCOUNTER — APPOINTMENT (OUTPATIENT)
Dept: URBAN - METROPOLITAN AREA CLINIC 135 | Facility: CLINIC | Age: 75
Setting detail: DERMATOLOGY
End: 2025-04-17

## 2025-04-17 DIAGNOSIS — L57.8 OTHER SKIN CHANGES DUE TO CHRONIC EXPOSURE TO NONIONIZING RADIATION: ICD-10-CM

## 2025-04-17 DIAGNOSIS — D36.1 BENIGN NEOPLASM OF PERIPHERAL NERVES AND AUTONOMIC NERVOUS SYSTEM: ICD-10-CM

## 2025-04-17 DIAGNOSIS — Z12.83 ENCOUNTER FOR SCREENING FOR MALIGNANT NEOPLASM OF SKIN: ICD-10-CM

## 2025-04-17 DIAGNOSIS — L82.1 OTHER SEBORRHEIC KERATOSIS: ICD-10-CM

## 2025-04-17 DIAGNOSIS — D22 MELANOCYTIC NEVI: ICD-10-CM

## 2025-04-17 DIAGNOSIS — D18.0 HEMANGIOMA: ICD-10-CM

## 2025-04-17 PROBLEM — D18.01 HEMANGIOMA OF SKIN AND SUBCUTANEOUS TISSUE: Status: ACTIVE | Noted: 2025-04-17

## 2025-04-17 PROBLEM — D36.13 BENIGN NEOPLASM OF PERIPHERAL NERVES AND AUTONOMIC NERVOUS SYSTEM OF LOWER LIMB, INCLUDING HIP: Status: ACTIVE | Noted: 2025-04-17

## 2025-04-17 PROBLEM — D22.9 MELANOCYTIC NEVI, UNSPECIFIED: Status: ACTIVE | Noted: 2025-04-17

## 2025-04-17 PROCEDURE — ? PRESCRIPTION

## 2025-04-17 PROCEDURE — ? COUNSELING

## 2025-04-17 PROCEDURE — 99214 OFFICE O/P EST MOD 30 MIN: CPT

## 2025-04-17 RX ORDER — IMIQUIMOD 12.5 MG/.25G
CREAM TOPICAL TIW
Qty: 12 | Refills: 2 | Status: ERX | COMMUNITY
Start: 2025-04-17

## 2025-04-17 RX ADMIN — IMIQUIMOD: 12.5 CREAM TOPICAL at 00:00

## 2025-04-17 ASSESSMENT — LOCATION ZONE DERM
LOCATION ZONE: ARM
LOCATION ZONE: LEG
LOCATION ZONE: FACE

## 2025-04-17 ASSESSMENT — LOCATION SIMPLE DESCRIPTION DERM
LOCATION SIMPLE: LEFT TEMPLE
LOCATION SIMPLE: LEFT CHEEK
LOCATION SIMPLE: LEFT FOREARM
LOCATION SIMPLE: LEFT THIGH
LOCATION SIMPLE: LEFT ZYGOMA
LOCATION SIMPLE: LEFT POSTERIOR THIGH

## 2025-04-17 ASSESSMENT — LOCATION DETAILED DESCRIPTION DERM
LOCATION DETAILED: LEFT DISTAL POSTERIOR THIGH
LOCATION DETAILED: LEFT ANTERIOR DISTAL THIGH
LOCATION DETAILED: LEFT CENTRAL ZYGOMA
LOCATION DETAILED: LEFT PROXIMAL DORSAL FOREARM
LOCATION DETAILED: LEFT SUPERIOR CENTRAL MALAR CHEEK
LOCATION DETAILED: LEFT CENTRAL TEMPLE
LOCATION DETAILED: LEFT ANTERIOR PROXIMAL THIGH

## 2025-05-08 ENCOUNTER — APPOINTMENT (OUTPATIENT)
Dept: URBAN - METROPOLITAN AREA CLINIC 135 | Facility: CLINIC | Age: 75
Setting detail: DERMATOLOGY
End: 2025-05-08

## 2025-05-08 VITALS — WEIGHT: 250 LBS | HEIGHT: 62 IN

## 2025-05-08 DIAGNOSIS — L57.8 OTHER SKIN CHANGES DUE TO CHRONIC EXPOSURE TO NONIONIZING RADIATION: ICD-10-CM

## 2025-05-08 PROCEDURE — ? PRESCRIPTION MEDICATION MANAGEMENT

## 2025-05-08 PROCEDURE — ? COUNSELING

## 2025-05-08 PROCEDURE — ? PRESCRIPTION

## 2025-05-08 PROCEDURE — 99214 OFFICE O/P EST MOD 30 MIN: CPT

## 2025-05-08 RX ORDER — IMIQUIMOD 12.5 MG/.25G
CREAM TOPICAL TIW
Qty: 12 | Refills: 2 | Status: ERX

## 2025-05-08 ASSESSMENT — LOCATION SIMPLE DESCRIPTION DERM
LOCATION SIMPLE: LEFT TEMPLE
LOCATION SIMPLE: LEFT ZYGOMA

## 2025-05-08 ASSESSMENT — LOCATION DETAILED DESCRIPTION DERM
LOCATION DETAILED: LEFT CENTRAL ZYGOMA
LOCATION DETAILED: LEFT CENTRAL TEMPLE

## 2025-05-08 ASSESSMENT — LOCATION ZONE DERM: LOCATION ZONE: FACE

## 2025-05-08 NOTE — PROCEDURE: PRESCRIPTION MEDICATION MANAGEMENT
Detail Level: Zone
Continue Regimen: imiquimod 5 % topical cream packets for additional 4 weeks
Plan: Continue imiquimod 5 % topical cream packets for 1 more week, stop for 4 weeks, then continue additional 4 weeks
Render In Strict Bullet Format?: No

## 2025-08-12 ENCOUNTER — APPOINTMENT (OUTPATIENT)
Dept: URBAN - METROPOLITAN AREA CLINIC 135 | Facility: CLINIC | Age: 75
Setting detail: DERMATOLOGY
End: 2025-08-12

## 2025-08-12 DIAGNOSIS — L72.0 EPIDERMAL CYST: ICD-10-CM

## 2025-08-12 DIAGNOSIS — L57.8 OTHER SKIN CHANGES DUE TO CHRONIC EXPOSURE TO NONIONIZING RADIATION: ICD-10-CM | Status: RESOLVED

## 2025-08-12 PROCEDURE — ? COUNSELING

## 2025-08-12 ASSESSMENT — LOCATION SIMPLE DESCRIPTION DERM
LOCATION SIMPLE: LEFT FOREHEAD
LOCATION SIMPLE: LEFT TEMPLE
LOCATION SIMPLE: NOSE
LOCATION SIMPLE: LEFT ZYGOMA
LOCATION SIMPLE: LEFT ZYGOMA

## 2025-08-12 ASSESSMENT — LOCATION DETAILED DESCRIPTION DERM
LOCATION DETAILED: LEFT CENTRAL ZYGOMA
LOCATION DETAILED: LEFT CENTRAL TEMPLE
LOCATION DETAILED: LEFT INFERIOR FOREHEAD
LOCATION DETAILED: LEFT CENTRAL ZYGOMA
LOCATION DETAILED: LEFT NASAL DORSUM

## 2025-08-12 ASSESSMENT — LOCATION ZONE DERM
LOCATION ZONE: FACE
LOCATION ZONE: NOSE
LOCATION ZONE: FACE